# Patient Record
Sex: MALE | Race: BLACK OR AFRICAN AMERICAN | Employment: FULL TIME | URBAN - METROPOLITAN AREA
[De-identification: names, ages, dates, MRNs, and addresses within clinical notes are randomized per-mention and may not be internally consistent; named-entity substitution may affect disease eponyms.]

---

## 2018-10-29 ENCOUNTER — OFFICE VISIT (OUTPATIENT)
Dept: FAMILY MEDICINE CLINIC | Age: 43
End: 2018-10-29

## 2018-10-29 VITALS
DIASTOLIC BLOOD PRESSURE: 98 MMHG | RESPIRATION RATE: 12 BRPM | OXYGEN SATURATION: 100 % | TEMPERATURE: 97.5 F | WEIGHT: 170 LBS | BODY MASS INDEX: 19.67 KG/M2 | HEIGHT: 78 IN | HEART RATE: 92 BPM | SYSTOLIC BLOOD PRESSURE: 143 MMHG

## 2018-10-29 DIAGNOSIS — M79.672 PAIN IN BOTH FEET: Primary | ICD-10-CM

## 2018-10-29 DIAGNOSIS — M79.671 PAIN IN BOTH FEET: Primary | ICD-10-CM

## 2018-10-29 DIAGNOSIS — N52.9 ERECTILE DYSFUNCTION, UNSPECIFIED ERECTILE DYSFUNCTION TYPE: ICD-10-CM

## 2018-10-29 RX ORDER — CLONAZEPAM 1 MG/1
TABLET ORAL 2 TIMES DAILY
COMMUNITY
End: 2018-11-01

## 2018-10-29 RX ORDER — METHIMAZOLE 5 MG/1
TABLET ORAL 3 TIMES DAILY
COMMUNITY
End: 2019-01-23

## 2018-10-29 RX ORDER — TADALAFIL 20 MG/1
TABLET ORAL
Qty: 8 TAB | Refills: 2 | Status: SHIPPED | OUTPATIENT
Start: 2018-10-29 | End: 2019-04-22 | Stop reason: SDUPTHER

## 2018-10-29 NOTE — PATIENT INSTRUCTIONS
Follow up with the podiatrist as planned next month. For the ED, we will do a trial of cialis. Take it every 3 days as needed. Recheck as needed. Erectile Dysfunction: Care Instructions Your Care Instructions A man has erectile dysfunction (ED) when he routinely can't get or keep an erection that allows satisfactory sex. He may not be able to have an erection at any time. Or he may not be able to have one that is firm enough or lasts long enough to complete intercourse. ED is not the same as having trouble getting an erection now and then. That's common. It happens to most men at some time. ED can be caused by problems with the blood vessels, nerves, or hormones. It can be caused by diabetes, heart disease, and injuries. Nerve disorders, such as multiple sclerosis or Parkinson's disease, can also cause it. ED can also be caused by medicines, alcohol, and tobacco. Or it may be caused by depression, stress, grief, or relationship problems. Follow-up care is a key part of your treatment and safety. Be sure to make and go to all appointments, and call your doctor if you are having problems. It's also a good idea to know your test results and keep a list of the medicines you take. How can you care for yourself at home? 
 Lifestyle 
  · Limit alcohol. Have no more than 2 drinks a day.  
  · Do not smoke. Smoking makes it harder for the blood vessels in the penis to relax and let blood flow in. If you need help quitting, talk to your doctor about stop-smoking programs and medicines. These can increase your chances of quitting for good.  
  · Do not use cocaine, heroin, or other illegal drugs.  
  · Try to reduce stress.  
  · Give yourself time to adjust to change. Changes in your job, family, relationships, home life, and other areas can cause stress.  And stress can cause erection problems.  
 Work with your partner 
  · Don't assume that you know what your partner likes when it comes to sex. Zita Kerr may be wrong. Talk about what each of you does and does not enjoy.  
  · Make time outside of the bedroom to talk about your sex life. If you avoid sex because you are afraid of having erection problems, your partner may worry that you are no longer interested.  
  · If you and your partner have trouble talking about sex, see a therapist who can help you talk about it. Reading books with your partner about sexual health may also help.  
  · Relax. Take time for more foreplay. Worrying about your erections may only make things worse. Medicines 
  · Tell your doctor about all the medicines that you take. ? Some medicines can cause erection problems. ? Some medicines can have dangerous interactions with medicines that are prescribed for ED, including over-the-counter medicines and herbal products.  
  · Be safe with medicines. Take your medicines exactly as prescribed. Call your doctor if you think you are having a problem with your medicine.  
  · Talk to your doctor about trying a medicine to help you keep an erection. This could be a medicine such as Viagra, Levitra, or Cialis. If you have a heart problem, ask your doctor if these are safe for you. Do not take these medicines if you take nitroglycerin or other nitrate medicine. When should you call for help? Call your doctor now or seek immediate medical care if: 
  · You took a medicine for erectile dysfunction and you have an erection that lasts longer than 3 hours.  
 Watch closely for changes in your health, and be sure to contact your doctor if you have any problems. Where can you learn more? Go to http://harshil-elmer.info/. Enter [id number] in the search box to learn more about \"Erectile Dysfunction: Care Instructions. \" Current as of: December 3, 2017 Content Version: 11.8 © 8941-7356 [employer].  Care instructions adapted under license by [employer] (which disclaims liability or warranty for this information). If you have questions about a medical condition or this instruction, always ask your healthcare professional. [place] [address] any warranty or liability for your use of this information.

## 2018-10-29 NOTE — PROGRESS NOTES
Rm 1 
Patient presents to the clinic Chief Complaint Patient presents with  Foot Pain  
  bottom of both foot, tingling  Erectile Dysfunction Depression Screening: PHQ over the last two weeks 10/29/2018 Little interest or pleasure in doing things Not at all Feeling down, depressed, irritable, or hopeless Several days Total Score PHQ 2 1 Learning Assessment: 
Learning Assessment 10/29/2018 PRIMARY LEARNER Patient HIGHEST LEVEL OF EDUCATION - PRIMARY LEARNER  > 4 YEARS OF COLLEGE  
BARRIERS PRIMARY LEARNER NONE  
CO-LEARNER CAREGIVER No  
PRIMARY LANGUAGE ENGLISH  
LEARNER PREFERENCE PRIMARY VIDEOS  
  PICTURES  
ANSWERED BY patient RELATIONSHIP SELF Abuse Screening: No flowsheet data found. Health Maintenance reviewed and discussed per provider: yes Coordination of Care: 1. Have you been to the ER, urgent care clinic since your last visit? Hospitalized since your last visit? no 
 
2. Have you seen or consulted any other health care providers outside of the [address] [place] [address] since your last visit? Include any pap smears or colon screening.  no

## 2018-10-30 ENCOUNTER — TELEPHONE (OUTPATIENT)
Dept: FAMILY MEDICINE CLINIC | Age: 43
End: 2018-10-30

## 2018-10-30 NOTE — TELEPHONE ENCOUNTER
Pt stating he needs anxiety medication (Klonopin). He has an old script from his old PCP but when he tried to fill it at his pharmacy yesterday, they would not fill it because it was written by his old PCP and not Dr Obey Carey. Pt states he has about 5 pills left and he will need a refill.

## 2018-10-30 NOTE — PROGRESS NOTES
HISTORY OF PRESENT ILLNESS Jordan Mcardle is a 37 y.o. male. Mr. Lauren Mccullough is here to establish care for a couple of reasons. First of all, he has had tingling in both feet for 4-6 months. When he wears dress shoes that are flat, he notices it more. Also, when he is on his feet all day it gets worse. He has an appt with podiatry coming up. Secondly, he c/o erectile dysfunction. He can get erections but they go away quickly, He has no history of heart problems and doesn't take any medication that is a contraindication to ED meds. At the very end of the visit he showed up a two y/o bottle of Klonopin, asked for a refill. Told him that we generally don't prescribe that long-term and we didn't address that this visit, we can talk about that next time. Appaently, his job is getting more stressful Review of Systems Constitutional: Negative for chills and fever. HENT: Negative for hearing loss. Eyes: Negative for blurred vision and double vision. Respiratory: Negative for cough and shortness of breath. Cardiovascular: Negative for chest pain and palpitations. Gastrointestinal: Negative for abdominal pain, nausea and vomiting. Genitourinary:  
     See hpi Musculoskeletal:  
     Foot pain, Neurological: Positive for tingling. Negative for headaches. Psychiatric/Behavioral: Negative for depression and suicidal ideas. The patient is nervous/anxious. Physical Exam  
Constitutional: He is oriented to person, place, and time. He appears well-developed and well-nourished. Eyes: Pupils are equal, round, and reactive to light. Neck: Neck supple. Cardiovascular: Normal rate and regular rhythm. Pulmonary/Chest: Effort normal and breath sounds normal.  
Abdominal: Soft. There is no tenderness. Genitourinary: Penis normal. No penile tenderness. Musculoskeletal: He exhibits no edema or tenderness. Lymphadenopathy:  
  He has no cervical adenopathy. Neurological: He is alert and oriented to person, place, and time. Foot sensory exam normal  
Nursing note and vitals reviewed. ASSESSMENT and PLAN 
  ICD-10-CM ICD-9-CM 1. Pain in both feet M79.671 729.5   
 M79.672 2.  Erectile dysfunction, unspecified erectile dysfunction type N52.9 607.84

## 2018-11-01 ENCOUNTER — OFFICE VISIT (OUTPATIENT)
Dept: FAMILY MEDICINE CLINIC | Age: 43
End: 2018-11-01

## 2018-11-01 VITALS
HEART RATE: 90 BPM | OXYGEN SATURATION: 100 % | RESPIRATION RATE: 16 BRPM | TEMPERATURE: 97.4 F | WEIGHT: 174 LBS | BODY MASS INDEX: 20.13 KG/M2 | SYSTOLIC BLOOD PRESSURE: 137 MMHG | DIASTOLIC BLOOD PRESSURE: 95 MMHG | HEIGHT: 78 IN

## 2018-11-01 DIAGNOSIS — F41.1 GENERALIZED ANXIETY DISORDER: Primary | ICD-10-CM

## 2018-11-01 DIAGNOSIS — G47.00 INSOMNIA, UNSPECIFIED TYPE: ICD-10-CM

## 2018-11-01 RX ORDER — TRAZODONE HYDROCHLORIDE 50 MG/1
50 TABLET ORAL
Qty: 30 TAB | Refills: 1 | Status: SHIPPED | OUTPATIENT
Start: 2018-11-01 | End: 2019-01-23

## 2018-11-01 RX ORDER — CITALOPRAM 10 MG/1
10 TABLET ORAL DAILY
Qty: 30 TAB | Refills: 1 | Status: SHIPPED | OUTPATIENT
Start: 2018-11-01 | End: 2019-02-08

## 2018-11-01 RX ORDER — CLONAZEPAM 1 MG/1
TABLET ORAL
Qty: 20 TAB | Refills: 0 | Status: SHIPPED | OUTPATIENT
Start: 2018-11-01 | End: 2018-12-04 | Stop reason: SDUPTHER

## 2018-11-01 NOTE — PATIENT INSTRUCTIONS
For the anxiety, take celexa every morning regularly. After a few weeks you should start to notice a difference in your stress levels. In the meantime, there are a few klonopin tablets to use in acutely stressful situations. This is not meant to be a long-term medication, it can be addicting if used long-term. For sleep, take trazodone every night at bedtime. Continue with your therapist, that's very important. Recheck 1 month.

## 2018-11-01 NOTE — PROGRESS NOTES
Rm 3 
Patient presents to the clinic Chief Complaint Patient presents with  Anxiety  
  wants medication for anxitey Depression Screening: PHQ over the last two weeks 11/1/2018 10/29/2018 Little interest or pleasure in doing things Not at all Not at all Feeling down, depressed, irritable, or hopeless Not at all Several days Total Score PHQ 2 0 1 Learning Assessment: 
Learning Assessment 10/29/2018 PRIMARY LEARNER Patient HIGHEST LEVEL OF EDUCATION - PRIMARY LEARNER  > 4 YEARS OF COLLEGE  
BARRIERS PRIMARY LEARNER NONE  
CO-LEARNER CAREGIVER No  
PRIMARY LANGUAGE ENGLISH  
LEARNER PREFERENCE PRIMARY VIDEOS  
  PICTURES  
ANSWERED BY patient RELATIONSHIP SELF Abuse Screening: No flowsheet data found. Health Maintenance reviewed and discussed per provider: yes Coordination of Care: 1. Have you been to the ER, urgent care clinic since your last visit? Hospitalized since your last visit? no 
 
2. Have you seen or consulted any other health care providers outside of the [facility] since your last visit? Include any pap smears or colon screening.  no

## 2018-11-01 NOTE — PROGRESS NOTES
HISTORY OF PRESENT ILLNESS Donna Fleming is a 37 y.o. male. Mr. Vickie Patrick presents to the office today for feelings of depression and anxiety x 3 weeks. He says that following his promotion at work he has felt under considerably more stress and is trying to use the resources that he can to better take care of himself. He says he is seeing a therapist regularly and she had encouraged him to restart his anxiety medication or to ask his new primary care about alternatives. He had been taking Klonopin only. He denies any suicidal or homicidal ideation. He also mentions that he can't get more than 4-5 hours of sleep, but he's afraid of the sleeping pills. Review of Systems Constitutional: Negative for chills and fever. Eyes: Negative for blurred vision and double vision. Respiratory: Negative for cough and shortness of breath. Cardiovascular: Negative for chest pain and palpitations. Gastrointestinal: Negative for abdominal pain, nausea and vomiting. Neurological: Negative for headaches. Psychiatric/Behavioral: Positive for depression. Negative for hallucinations, memory loss and suicidal ideas. The patient is nervous/anxious and has insomnia. Physical Exam  
Constitutional: He is oriented to person, place, and time. He appears well-developed and well-nourished. Eyes: Pupils are equal, round, and reactive to light. Neck: Neck supple. Cardiovascular: Normal rate, regular rhythm and normal heart sounds. Pulmonary/Chest: Effort normal and breath sounds normal. No respiratory distress. He has no wheezes. He has no rales. Abdominal: Soft. He exhibits no distension. There is no tenderness. Lymphadenopathy:  
  He has no cervical adenopathy. Neurological: He is alert and oriented to person, place, and time. Psychiatric: He has a normal mood and affect. His behavior is normal.  
Nursing note and vitals reviewed. ASSESSMENT and PLAN 
  ICD-10-CM ICD-9-CM 1. Generalized anxiety disorder F41.1 300.02 clonazePAM (KLONOPIN) 1 mg tablet 2.  Insomnia, unspecified type G47.00 780.52

## 2018-11-13 ENCOUNTER — OFFICE VISIT (OUTPATIENT)
Dept: FAMILY MEDICINE CLINIC | Age: 43
End: 2018-11-13

## 2018-11-13 VITALS
HEART RATE: 97 BPM | OXYGEN SATURATION: 99 % | DIASTOLIC BLOOD PRESSURE: 91 MMHG | TEMPERATURE: 97.5 F | WEIGHT: 169.6 LBS | HEIGHT: 78 IN | SYSTOLIC BLOOD PRESSURE: 130 MMHG | BODY MASS INDEX: 19.62 KG/M2 | RESPIRATION RATE: 16 BRPM

## 2018-11-13 DIAGNOSIS — F41.1 GENERALIZED ANXIETY DISORDER: Primary | ICD-10-CM

## 2018-11-13 NOTE — LETTER
NOTIFICATION RETURN TO WORK / SCHOOL 
 
11/13/2018 4:09 PM 
 
Mr. Mari Vázquez [address] [facility] [address] [phone] To Whom It May Concern: 
 
Mari Vázquez is currently under the care of [address] [facility]. He will return to work/school on: 11/14/2018. He is under my care for acute anxiety If there are questions or concerns please have the patient contact our office. Sincerely, Paulino Gautam MD

## 2018-11-13 NOTE — LETTER
NOTIFICATION RETURN TO WORK / SCHOOL 
 
11/13/2018 4:00 PM 
 
Mr. Fanny Alexander [address] [facility] [address] [phone] To Whom It May Concern: 
 
Fanny Alexander is currently under the care of [address] [facility]. He will return to work/school on: 11/15/2018. He is currently under my care for an anxiety disorder. If there are questions or concerns please have the patient contact our office. Sincerely, Rudy Koenig MD

## 2018-11-13 NOTE — PATIENT INSTRUCTIONS
Continue the current medications and especially the counseling with your therapist. 
 
Alem Caro as needed.

## 2018-11-13 NOTE — PROGRESS NOTES
Rm 2 
Patient presents to the clinic Chief Complaint Patient presents with  Fatigue  
  making it hard to do his job which started in September  Hyperemesis  
  which starts with a cough in the mornings before going to work, started towards end of September Depression Screening: PHQ over the last two weeks 11/1/2018 10/29/2018 Little interest or pleasure in doing things Not at all Not at all Feeling down, depressed, irritable, or hopeless Not at all Several days Total Score PHQ 2 0 1 Learning Assessment: 
Learning Assessment 10/29/2018 PRIMARY LEARNER Patient HIGHEST LEVEL OF EDUCATION - PRIMARY LEARNER  > 4 YEARS OF COLLEGE  
BARRIERS PRIMARY LEARNER NONE  
CO-LEARNER CAREGIVER No  
PRIMARY LANGUAGE ENGLISH  
LEARNER PREFERENCE PRIMARY VIDEOS  
  PICTURES  
ANSWERED BY patient RELATIONSHIP SELF Abuse Screening: No flowsheet data found. Health Maintenance reviewed and discussed per provider: yes Coordination of Care: 1. Have you been to the ER, urgent care clinic since your last visit? Hospitalized since your last visit? no 
 
2. Have you seen or consulted any other health care providers outside of the [facility] since your last visit? Include any pap smears or colon screening.  no

## 2018-12-04 DIAGNOSIS — F41.1 GENERALIZED ANXIETY DISORDER: ICD-10-CM

## 2018-12-04 RX ORDER — CLONAZEPAM 1 MG/1
TABLET ORAL
Qty: 15 TAB | Refills: 0 | Status: SHIPPED | OUTPATIENT
Start: 2018-12-04 | End: 2019-01-03 | Stop reason: SDUPTHER

## 2019-01-02 ENCOUNTER — HOSPITAL ENCOUNTER (EMERGENCY)
Age: 44
Discharge: HOME | End: 2019-01-02
Attending: EMERGENCY MEDICINE
Payer: COMMERCIAL

## 2019-01-02 VITALS
BODY MASS INDEX: 19.67 KG/M2 | WEIGHT: 170 LBS | RESPIRATION RATE: 15 BRPM | HEART RATE: 93 BPM | TEMPERATURE: 98.1 F | OXYGEN SATURATION: 100 % | SYSTOLIC BLOOD PRESSURE: 148 MMHG | HEIGHT: 78 IN | DIASTOLIC BLOOD PRESSURE: 98 MMHG

## 2019-01-02 DIAGNOSIS — R79.89 ABNORMAL LFTS: ICD-10-CM

## 2019-01-02 DIAGNOSIS — E86.0 DEHYDRATION: ICD-10-CM

## 2019-01-02 DIAGNOSIS — R53.1 GENERAL WEAKNESS: Primary | ICD-10-CM

## 2019-01-02 LAB
ALBUMIN SERPL-MCNC: 4.4 G/DL (ref 3.4–5)
ALBUMIN/GLOB SERPL: 1.1 {RATIO} (ref 0.8–1.7)
ALP SERPL-CCNC: 110 U/L (ref 45–117)
ALT SERPL-CCNC: 139 U/L (ref 16–61)
ANION GAP SERPL CALC-SCNC: 19 MMOL/L (ref 3–18)
APPEARANCE UR: CLEAR
AST SERPL-CCNC: 200 U/L (ref 15–37)
BACTERIA URNS QL MICRO: ABNORMAL /HPF
BASOPHILS # BLD: 0 K/UL (ref 0–0.1)
BASOPHILS NFR BLD: 0 % (ref 0–2)
BILIRUB SERPL-MCNC: 0.8 MG/DL (ref 0.2–1)
BILIRUB UR QL: NEGATIVE
BUN SERPL-MCNC: 7 MG/DL (ref 7–18)
BUN/CREAT SERPL: 7 (ref 12–20)
CALCIUM SERPL-MCNC: 9.2 MG/DL (ref 8.5–10.1)
CHLORIDE SERPL-SCNC: 93 MMOL/L (ref 100–108)
CK MB CFR SERPL CALC: 0.6 % (ref 0–4)
CK MB SERPL-MCNC: 2.4 NG/ML (ref 5–25)
CK SERPL-CCNC: 373 U/L (ref 39–308)
CO2 SERPL-SCNC: 21 MMOL/L (ref 21–32)
COLOR UR: YELLOW
CREAT SERPL-MCNC: 0.98 MG/DL (ref 0.6–1.3)
DIFFERENTIAL METHOD BLD: ABNORMAL
EOSINOPHIL # BLD: 0 K/UL (ref 0–0.4)
EOSINOPHIL NFR BLD: 0 % (ref 0–5)
EPITH CASTS URNS QL MICRO: ABNORMAL /LPF (ref 0–5)
ERYTHROCYTE [DISTWIDTH] IN BLOOD BY AUTOMATED COUNT: 13.6 % (ref 11.6–14.5)
GLOBULIN SER CALC-MCNC: 4 G/DL (ref 2–4)
GLUCOSE SERPL-MCNC: 81 MG/DL (ref 74–99)
GLUCOSE UR STRIP.AUTO-MCNC: NEGATIVE MG/DL
GRAN CASTS URNS QL MICRO: ABNORMAL /LPF
HCT VFR BLD AUTO: 39.9 % (ref 36–48)
HGB BLD-MCNC: 13.9 G/DL (ref 13–16)
HGB UR QL STRIP: ABNORMAL
KETONES UR QL STRIP.AUTO: 80 MG/DL
LEUKOCYTE ESTERASE UR QL STRIP.AUTO: NEGATIVE
LYMPHOCYTES # BLD: 1.4 K/UL (ref 0.9–3.6)
LYMPHOCYTES NFR BLD: 27 % (ref 21–52)
MAGNESIUM SERPL-MCNC: 2.2 MG/DL (ref 1.6–2.6)
MCH RBC QN AUTO: 34.6 PG (ref 24–34)
MCHC RBC AUTO-ENTMCNC: 34.8 G/DL (ref 31–37)
MCV RBC AUTO: 99.3 FL (ref 74–97)
MONOCYTES # BLD: 0.3 K/UL (ref 0.05–1.2)
MONOCYTES NFR BLD: 5 % (ref 3–10)
NEUTS SEG # BLD: 3.5 K/UL (ref 1.8–8)
NEUTS SEG NFR BLD: 68 % (ref 40–73)
NITRITE UR QL STRIP.AUTO: NEGATIVE
PH UR STRIP: 5 [PH] (ref 5–8)
PLATELET # BLD AUTO: 146 K/UL (ref 135–420)
PMV BLD AUTO: 11.1 FL (ref 9.2–11.8)
POTASSIUM SERPL-SCNC: 4.4 MMOL/L (ref 3.5–5.5)
PROT SERPL-MCNC: 8.4 G/DL (ref 6.4–8.2)
PROT UR STRIP-MCNC: 300 MG/DL
RBC # BLD AUTO: 4.02 M/UL (ref 4.7–5.5)
RBC #/AREA URNS HPF: ABNORMAL /HPF (ref 0–5)
SODIUM SERPL-SCNC: 133 MMOL/L (ref 136–145)
SP GR UR REFRACTOMETRY: 1.03 (ref 1–1.03)
TROPONIN I SERPL-MCNC: <0.02 NG/ML (ref 0–0.04)
TSH SERPL DL<=0.05 MIU/L-ACNC: 1.9 UIU/ML (ref 0.36–3.74)
UROBILINOGEN UR QL STRIP.AUTO: 1 EU/DL (ref 0.2–1)
WBC # BLD AUTO: 5.1 K/UL (ref 4.6–13.2)
WBC URNS QL MICRO: 0 /HPF (ref 0–4)

## 2019-01-02 PROCEDURE — 96361 HYDRATE IV INFUSION ADD-ON: CPT

## 2019-01-02 PROCEDURE — 74011250637 HC RX REV CODE- 250/637: Performed by: EMERGENCY MEDICINE

## 2019-01-02 PROCEDURE — 93005 ELECTROCARDIOGRAM TRACING: CPT

## 2019-01-02 PROCEDURE — 85025 COMPLETE CBC W/AUTO DIFF WBC: CPT

## 2019-01-02 PROCEDURE — 80053 COMPREHEN METABOLIC PANEL: CPT

## 2019-01-02 PROCEDURE — 74011250636 HC RX REV CODE- 250/636: Performed by: EMERGENCY MEDICINE

## 2019-01-02 PROCEDURE — 84443 ASSAY THYROID STIM HORMONE: CPT

## 2019-01-02 PROCEDURE — 81001 URINALYSIS AUTO W/SCOPE: CPT

## 2019-01-02 PROCEDURE — 83735 ASSAY OF MAGNESIUM: CPT

## 2019-01-02 PROCEDURE — 99284 EMERGENCY DEPT VISIT MOD MDM: CPT

## 2019-01-02 PROCEDURE — 96360 HYDRATION IV INFUSION INIT: CPT

## 2019-01-02 PROCEDURE — 82550 ASSAY OF CK (CPK): CPT

## 2019-01-02 RX ORDER — THERA TABS 400 MCG
1 TAB ORAL
Status: COMPLETED | OUTPATIENT
Start: 2019-01-02 | End: 2019-01-02

## 2019-01-02 RX ORDER — THERA TABS 400 MCG
1 TAB ORAL DAILY
Qty: 30 TAB | Refills: 0 | Status: SHIPPED | OUTPATIENT
Start: 2019-01-02 | End: 2019-02-01

## 2019-01-02 RX ADMIN — SODIUM CHLORIDE 1000 ML: 900 INJECTION, SOLUTION INTRAVENOUS at 15:21

## 2019-01-02 RX ADMIN — THERA TABS 1 TABLET: TAB at 14:31

## 2019-01-02 RX ADMIN — SODIUM CHLORIDE 1000 ML: 900 INJECTION, SOLUTION INTRAVENOUS at 14:27

## 2019-01-02 NOTE — ED NOTES
Purposeful rounding completed: 
 
Side rails up x 1:  YES Bed in low position and wheels locked: YES Call bell within reach: YES Comfort addressed: YES Toileting needs addressed: YES Plan of care reviewed/updated with patient and or family members: YES 
IV site assessed: YES Pain assessed and addressed: YES, 1

## 2019-01-02 NOTE — DISCHARGE INSTRUCTIONS
Patient Education        Dehydration: Care Instructions  Your Care Instructions  Dehydration happens when your body loses too much fluid. This might happen when you do not drink enough water or you lose large amounts of fluids from your body because of diarrhea, vomiting, or sweating. Severe dehydration can be life-threatening. Water and minerals called electrolytes help put your body fluids back in balance. Learn the early signs of fluid loss, and drink more fluids to prevent dehydration. Follow-up care is a key part of your treatment and safety. Be sure to make and go to all appointments, and call your doctor if you are having problems. It's also a good idea to know your test results and keep a list of the medicines you take. How can you care for yourself at home? · To prevent dehydration, drink plenty of fluids, enough so that your urine is light yellow or clear like water. Choose water and other caffeine-free clear liquids until you feel better. If you have kidney, heart, or liver disease and have to limit fluids, talk with your doctor before you increase the amount of fluids you drink. · If you do not feel like eating or drinking, try taking small sips of water, sports drinks, or other rehydration drinks. · Get plenty of rest.  To prevent dehydration  · Add more fluids to your diet and daily routine, unless your doctor has told you not to. · During hot weather, drink more fluids. Drink even more fluids if you exercise a lot. Stay away from drinks with alcohol or caffeine. · Watch for the symptoms of dehydration. These include:  ? A dry, sticky mouth. ? Dark yellow urine, and not much of it. ? Dry and sunken eyes. ? Feeling very tired. · Learn what problems can lead to dehydration. These include:  ? Diarrhea, fever, and vomiting. ? Any illness with a fever, such as pneumonia or the flu. ?  Activities that cause heavy sweating, such as endurance races and heavy outdoor work in hot or humid weather. ? Alcohol or drug abuse or withdrawal.  ? Certain medicines, such as cold and allergy pills (antihistamines), diet pills (diuretics), and laxatives. ? Certain diseases, such as diabetes, cancer, and heart or kidney disease. When should you call for help? Call 911 anytime you think you may need emergency care. For example, call if:    · You passed out (lost consciousness).    Call your doctor now or seek immediate medical care if:    · You are confused and cannot think clearly.     · You are dizzy or lightheaded, or you feel like you may faint.     · You have signs of needing more fluids. You have sunken eyes and a dry mouth, and you pass only a little dark urine.     · You cannot keep fluids down.    Watch closely for changes in your health, and be sure to contact your doctor if:    · You are not making tears.     · Your skin is very dry and sags slowly back into place after you pinch it.     · Your mouth and eyes are very dry. Where can you learn more? Go to http://harshil-elmer.info/. Enter Q467 in the search box to learn more about \"Dehydration: Care Instructions. \"  Current as of: November 20, 2017  Content Version: 11.8  © 9850-0722 [employer]. Care instructions adapted under license by [employer] (which disclaims liability or warranty for this information). If you have questions about a medical condition or this instruction, always ask your healthcare professional. [place] [address] any warranty or liability for your use of this information. Patient Education        Fatigue: Care Instructions  Your Care Instructions    Fatigue is a feeling of tiredness, exhaustion, or lack of energy. You may feel fatigue because of too much or not enough activity. It can also come from stress, lack of sleep, boredom, and poor diet. Many medical problems, such as viral infections, can cause fatigue.  Emotional problems, especially depression, are often the cause of fatigue. Fatigue is most often a symptom of another problem. Treatment for fatigue depends on the cause. For example, if you have fatigue because you have a certain health problem, treating this problem also treats your fatigue. If depression or anxiety is the cause, treatment may help. Follow-up care is a key part of your treatment and safety. Be sure to make and go to all appointments, and call your doctor if you are having problems. It's also a good idea to know your test results and keep a list of the medicines you take. How can you care for yourself at home? · Get regular exercise. But don't overdo it. Go back and forth between rest and exercise. · Get plenty of rest.  · Eat a healthy diet. Do not skip meals, especially breakfast.  · Reduce your use of caffeine, tobacco, and alcohol. Caffeine is most often found in coffee, tea, cola drinks, and chocolate. · Limit medicines that can cause fatigue. This includes tranquilizers and cold and allergy medicines. When should you call for help? Watch closely for changes in your health, and be sure to contact your doctor if:    · You have new symptoms such as fever or a rash.     · Your fatigue gets worse.     · You have been feeling down, depressed, or hopeless. Or you may have lost interest in things that you usually enjoy.     · You are not getting better as expected. Where can you learn more? Go to http://harshil-elmer.info/. Enter P761 in the search box to learn more about \"Fatigue: Care Instructions. \"  Current as of: November 20, 2017  Content Version: 11.8  © 8614-6458 [employer]. Care instructions adapted under license by [employer] (which disclaims liability or warranty for this information).  If you have questions about a medical condition or this instruction, always ask your healthcare professional. [address] any warranty or liability for your use of this information. Patient Education        Liver Function Tests: About These Tests  What is it? Liver function tests check how well your liver is working. Some tests measure the amount of certain enzymes in your blood to check whether the liver is damaged or inflamed. Other tests measure how well the liver can make important proteins or clear waste products from the body. Your doctor will use the test results to help look for certain conditions, such as hepatitis, cirrhosis, or gallbladder problems. Test results that are not normal do not always mean there is a problem with your liver. Your doctor can determine if there is a problem based on your results. The tests may include:  · Alkaline phosphatase (ALP). This test measures the amount of the enzyme ALP in your blood. · Total protein. A total serum protein test measures the amounts of two major groups of proteins in your blood (albumin and globulin) and the total amount of protein. · Bilirubin. This test measures the amount of bilirubin in your blood. When bilirubin levels are high, the skin and whites of the eyes may appear yellow (jaundice). This may be caused by liver disease. · Aspartate aminotransferase (AST) and alanine aminotransferase (ALT). These tests measure the amount of the enzymes AST and ALT in your blood. (Aminotransferase is also known as a transaminase. High levels may be called transaminitis.)  Why is this test done? Liver function tests check how well your liver is working. Some tests help show whether your liver is damaged or inflamed. Your doctor may order liver function tests if you have symptoms of liver disease. These tests also may be done to see how well a treatment for liver disease is working. How can you prepare for the test?  In general, you do not need to prepare before having these tests. Your doctor may give you some specific instructions to prepare.   What happens during the test?  A health professional takes a sample of your blood. What happens after the test?  You will probably be able to go home right away. When should you call for help? Watch closely for changes in your health, and be sure to contact your doctor if you have any problems. Follow-up care is a key part of your treatment and safety. Be sure to make and go to all appointments, and call your doctor if you are having problems. It's also a good idea to keep a list of the medicines you take. Ask your doctor when you can expect to have your test results. Where can you learn more? Go to http://harshil-elmer.info/. Enter M228 in the search box to learn more about \"Liver Function Tests: About These Tests. \"  Current as of: June 26, 2018  Content Version: 11.8  © 3427-1635 [employer]. Care instructions adapted under license by [employer] (which disclaims liability or warranty for this information). If you have questions about a medical condition or this instruction, always ask your healthcare professional. [address] any warranty or liability for your use of this information.

## 2019-01-02 NOTE — ED PROVIDER NOTES
EMERGENCY DEPARTMENT HISTORY AND PHYSICAL EXAM 
 
1:54 PM 
 
 
Date: 1/2/2019 Patient Name: Jade Stark History of Presenting Illness Chief Complaint Patient presents with  Dizziness  Fatigue  Arm Pain History Provided By: Patient Chief Complaint: Dizziness Duration: 1 Days Timing:  Acute Location: N/A Quality: Symptomatic Severity: Moderate Modifying Factors: Nothing makes it better or worse Associated Symptoms: appetite change, fatigue, tingling in feet Additional History (Context): Jade Stark is a 37 y.o. male with IBS and thyroid disease who presents with acute, symptomatic and moderate dizziness that started about 1 day ago. Patient said that he had this problem back in July of last year. He believes that it is stress related and thinks he is dehydrated. Patient also said that he has not been eating or drinking anything like he usually does. Denies any vomiting, diarrhea and bloody stool. Denies taking a multivitamin but has taken vitamin D before. He said that his PCP, Dr. Renetta Santiago, told him to come into the ER. Patient also said that he ahs seen a Podiatrist for his feet too. Nothing makes it better or worse and associated symptoms are appetite change, fatigue and tingling in feet. No other concerns or symptoms at this time. PCP: Mikey Claire MD 
 
Current Facility-Administered Medications Medication Dose Route Frequency Provider Last Rate Last Dose  sodium chloride 0.9 % bolus infusion 1,000 mL  1,000 mL IntraVENous ONCE Fabian Beltran MD 1,000 mL/hr at 01/02/19 1521 1,000 mL at 01/02/19 1521 Current Outpatient Medications Medication Sig Dispense Refill  therapeutic multivitamin (THERAGRAN) tablet Take 1 Tab by mouth daily for 30 doses. 30 Tab 0  clonazePAM (KLONOPIN) 1 mg tablet 1/2-1 tablet twice daily prn acute stressful situations 15 Tab 0  
 citalopram (CELEXA) 10 mg tablet Take 1 Tab by mouth daily.  30 Tab 1  
  traZODone (DESYREL) 50 mg tablet Take 1 Tab by mouth nightly. 30 Tab 1  
 methIMAzole (TAPAZOLE) 5 mg tablet Take  by mouth three (3) times daily.  tadalafil (CIALIS) 20 mg tablet One tablet every 3 days as needed 8 Tab 2 Past History Past Medical History: 
Past Medical History:  
Diagnosis Date  IBS (irritable bowel syndrome)  Thyroid disease Past Surgical History: 
History reviewed. No pertinent surgical history. Family History: 
Family History Problem Relation Age of Onset  Hypertension Mother Munguia Arthritis-osteo Mother Social History: 
Social History Tobacco Use  Smoking status: Never Smoker  Smokeless tobacco: Current User Substance Use Topics  Alcohol use: Yes  Drug use: No  
 
 
Allergies: 
No Known Allergies Review of Systems Review of Systems Constitutional: Positive for appetite change and fatigue. Negative for activity change and fever. HENT: Negative for congestion and rhinorrhea. Eyes: Negative for visual disturbance. Respiratory: Negative for shortness of breath. Cardiovascular: Negative for chest pain and palpitations. Gastrointestinal: Negative for abdominal pain, blood in stool, diarrhea, nausea and vomiting. Genitourinary: Negative for dysuria and hematuria. Musculoskeletal: Negative for back pain. Skin: Negative for rash. Neurological: Positive for dizziness. Negative for weakness and light-headedness. Tingling in feet Psychiatric/Behavioral: Negative for agitation. All other systems reviewed and are negative. Physical Exam  
 
Visit Vitals BP (!) 153/99 Pulse 94 Temp 98.1 °F (36.7 °C) Resp 15 Ht 6' 7\" (2.007 m) Wt 77.1 kg (170 lb) SpO2 99% BMI 19.15 kg/m² Physical Exam  
Constitutional: He appears well-developed and well-nourished. HENT:  
Head: Normocephalic and atraumatic. Mouth/Throat: Mucous membranes are dry (moderately ). Eyes: Conjunctivae are normal. Pupils are equal, round, and reactive to light. Neck: Normal range of motion. Neck supple. Cardiovascular: Normal rate and regular rhythm. Pulses: 
     Dorsalis pedis pulses are 2+ on the right side, and 2+ on the left side. Good dorsalis pedis pulse in LEs Pulmonary/Chest: Effort normal and breath sounds normal.  
Abdominal: Soft. Bowel sounds are normal.  
Musculoskeletal: Normal range of motion. Lymphadenopathy:  
  He has no cervical adenopathy. Neurological: He is alert. Skin: Skin is warm. Psychiatric: He has a normal mood and affect. Diagnostic Study Results Labs - Recent Results (from the past 12 hour(s)) URINALYSIS W/ RFLX MICROSCOPIC Collection Time: 01/02/19  1:30 PM  
Result Value Ref Range Color YELLOW Appearance CLEAR Specific gravity 1.027 1.005 - 1.030    
 pH (UA) 5.0 5.0 - 8.0 Protein 300 (A) NEG mg/dL Glucose NEGATIVE  NEG mg/dL Ketone 80 (A) NEG mg/dL Bilirubin NEGATIVE  NEG Blood SMALL (A) NEG Urobilinogen 1.0 0.2 - 1.0 EU/dL Nitrites NEGATIVE  NEG Leukocyte Esterase NEGATIVE  NEG    
URINE MICROSCOPIC ONLY Collection Time: 01/02/19  1:30 PM  
Result Value Ref Range WBC 0 0 - 4 /hpf  
 RBC 0 to 3 0 - 5 /hpf Epithelial cells 1+ 0 - 5 /lpf Bacteria 1+ (A) NEG /hpf  
 Granular cast 2 to 6 NEG /lpf EKG, 12 LEAD, INITIAL Collection Time: 01/02/19  2:00 PM  
Result Value Ref Range Ventricular Rate 96 BPM  
 Atrial Rate 96 BPM  
 P-R Interval 154 ms QRS Duration 82 ms Q-T Interval 362 ms QTC Calculation (Bezet) 457 ms Calculated P Axis 70 degrees Calculated R Axis 56 degrees Calculated T Axis 72 degrees Diagnosis Normal sinus rhythm Minimal voltage criteria for LVH, may be normal variant Borderline ECG No previous ECGs available CBC WITH AUTOMATED DIFF Collection Time: 01/02/19  2:06 PM  
Result Value Ref Range WBC 5.1 4.6 - 13.2 K/uL  
 RBC 4.02 (L) 4.70 - 5.50 M/uL  
 HGB 13.9 13.0 - 16.0 g/dL HCT 39.9 36.0 - 48.0 % MCV 99.3 (H) 74.0 - 97.0 FL  
 MCH 34.6 (H) 24.0 - 34.0 PG  
 MCHC 34.8 31.0 - 37.0 g/dL  
 RDW 13.6 11.6 - 14.5 % PLATELET 562 740 - 911 K/uL MPV 11.1 9.2 - 11.8 FL  
 NEUTROPHILS 68 40 - 73 % LYMPHOCYTES 27 21 - 52 % MONOCYTES 5 3 - 10 % EOSINOPHILS 0 0 - 5 % BASOPHILS 0 0 - 2 %  
 ABS. NEUTROPHILS 3.5 1.8 - 8.0 K/UL  
 ABS. LYMPHOCYTES 1.4 0.9 - 3.6 K/UL  
 ABS. MONOCYTES 0.3 0.05 - 1.2 K/UL  
 ABS. EOSINOPHILS 0.0 0.0 - 0.4 K/UL  
 ABS. BASOPHILS 0.0 0.0 - 0.1 K/UL  
 DF AUTOMATED METABOLIC PANEL, COMPREHENSIVE Collection Time: 01/02/19  2:06 PM  
Result Value Ref Range Sodium 133 (L) 136 - 145 mmol/L Potassium 4.4 3.5 - 5.5 mmol/L Chloride 93 (L) 100 - 108 mmol/L  
 CO2 21 21 - 32 mmol/L Anion gap 19 (H) 3.0 - 18 mmol/L Glucose 81 74 - 99 mg/dL BUN 7 7.0 - 18 MG/DL Creatinine 0.98 0.6 - 1.3 MG/DL  
 BUN/Creatinine ratio 7 (L) 12 - 20 GFR est AA >60 >60 ml/min/1.73m2 GFR est non-AA >60 >60 ml/min/1.73m2 Calcium 9.2 8.5 - 10.1 MG/DL Bilirubin, total 0.8 0.2 - 1.0 MG/DL  
 ALT (SGPT) 139 (H) 16 - 61 U/L  
 AST (SGOT) 200 (H) 15 - 37 U/L Alk. phosphatase 110 45 - 117 U/L Protein, total 8.4 (H) 6.4 - 8.2 g/dL Albumin 4.4 3.4 - 5.0 g/dL Globulin 4.0 2.0 - 4.0 g/dL A-G Ratio 1.1 0.8 - 1.7 MAGNESIUM Collection Time: 01/02/19  2:06 PM  
Result Value Ref Range Magnesium 2.2 1.6 - 2.6 mg/dL CARDIAC PANEL,(CK, CKMB & TROPONIN) Collection Time: 01/02/19  2:06 PM  
Result Value Ref Range  (H) 39 - 308 U/L  
 CK - MB 2.4 <3.6 ng/ml CK-MB Index 0.6 0.0 - 4.0 % Troponin-I, QT <0.02 0.0 - 0.045 NG/ML  
TSH 3RD GENERATION Collection Time: 01/02/19  2:06 PM  
Result Value Ref Range TSH 1.90 0.36 - 3.74 uIU/mL Radiologic Studies - No orders to display Medical Decision Making I am the first provider for this patient. I reviewed the vital signs, available nursing notes, past medical history, past surgical history, family history and social history. Vital Signs-Reviewed the patient's vital signs. Pulse Oximetry Analysis -  100% on room air Cardiac Monitor: 
Rate: 100 bpm 
 
EKG: Time 1400, 96, sinus rhythm, normal intervals and axes. There is no sign of acute ischemia. Records Reviewed: Nursing Notes and Old Medical Records (Time of Review: 1:54 PM) 
 
ED Course: Progress Notes, Reevaluation, and Consults: 
3:25 PM 
electrolytes, with anion gap of 19 
urinalysis 80 ketones ALT of 139 AST of 200 Bilirubin normal 
Pateint has no abdominal tenderness 3:40 PM 
Reevaluated, no distress, no abdomen tenderness or pain. Feels comfortable with follow up Provider Notes (Medical Decision Making):  
Patient presents to emergency department feeling fatigued lack of appetite history of irritable bowel syndrome patient feels dehydrated and also chronic lower extremity paresthesia per patient states that he presented to the emergency department because he was to be hydrated up. He called his primary care physician. There is no chest pain shortness of breath focal neurological deficit there is no new vomiting or diarrhea patient. I will check electrolyte 
hemoglobin, any thyroid issues. If negative plan giving patient fluids and patient will follow-up with his primary care physician for further management. Patient with dehydration, generalized weakness. Mildly ketotic. Hydrate multivitaimin and follow up with pcp and GI. Currently patient has no obvious sign of infection and no significant electrolyte imbalance. After hydration is completed, i will discharge patient. Diagnosis Clinical Impression: 1. General weakness 2. Dehydration 3. Abnormal LFTs Disposition: Discharged Follow-up Information Follow up With Specialties Details Why Contact Info Susan Denton MD Family Practice Call in 1 day Follow Up From Emergency Department [address] [place] [address] [id number] 
[phone] [facility] EMERGENCY DEPT Emergency Medicine  If symptoms worsen [address] 
[phone] Bruce Willingham MD Gastroenterology Call in 1 day Follow Up From Emergency Department [facility] [address] [place] [address] [id number] 
[phone] Medication List  
  
START taking these medications   
therapeutic multivitamin tablet Commonly known as:  [facility] Take 1 Tab by mouth daily for 30 doses. ASK your doctor about these medications   
citalopram 10 mg tablet Commonly known as:  Laurcaroline Cor[place]uckles Take 1 Tab by mouth daily. clonazePAM 1 mg tablet Commonly known as:  KlonoPIN 
1/2-1 tablet twice daily prn acute stressful situations 
  
methIMAzole 5 mg tablet Commonly known as:  TAPAZOLE 
  
tadalafil 20 mg tablet Commonly known as:  CIALIS One tablet every 3 days as needed 
  
traZODone 50 mg tablet Commonly known as:  Donzella Eaton Take 1 Tab by mouth nightly. Where to Get Your Medications Information about where to get these medications is not yet available Ask your nurse or doctor about these medications · therapeutic multivitamin tablet 
  
 
_______________________________ Scribe Attestation Gabby Dickson acting as a scribe for and in the presence of Marianna Borges MD     
January 02, 2019 at 1:54 PM 
    
Provider Attestation:     
I personally performed the services described in the documentation, reviewed the documentation, as recorded by the scribe in my presence, and it accurately and completely records my words and actions. January 02, 2019 at 1:54 PM - Marianna Borges MD   
 
 
_______________________________

## 2019-01-02 NOTE — ED NOTES
Purposeful rounding completed: 
 
Side rails up x 1:  YES Bed in low position and wheels locked: YES Call bell within reach: YES Comfort addressed: YES Toileting needs addressed: YES Plan of care reviewed/updated with patient and or family members: YES 
IV site assessed: YES Pain assessed and addressed: YES, 0

## 2019-01-02 NOTE — ED NOTES
I have reviewed discharge instructions with the patient. The patient verbalized understanding. Patient armband removed and shredded. Patient will redress self and ambulate independently out of care area

## 2019-01-03 DIAGNOSIS — F41.1 GENERALIZED ANXIETY DISORDER: ICD-10-CM

## 2019-01-03 DIAGNOSIS — G47.00 INSOMNIA, UNSPECIFIED TYPE: Primary | ICD-10-CM

## 2019-01-03 LAB
ATRIAL RATE: 96 BPM
CALCULATED P AXIS, ECG09: 70 DEGREES
CALCULATED R AXIS, ECG10: 56 DEGREES
CALCULATED T AXIS, ECG11: 72 DEGREES
DIAGNOSIS, 93000: NORMAL
P-R INTERVAL, ECG05: 154 MS
Q-T INTERVAL, ECG07: 362 MS
QRS DURATION, ECG06: 82 MS
QTC CALCULATION (BEZET), ECG08: 457 MS
VENTRICULAR RATE, ECG03: 96 BPM

## 2019-01-03 RX ORDER — CLONAZEPAM 1 MG/1
TABLET ORAL
Qty: 15 TAB | Refills: 0 | Status: SHIPPED | OUTPATIENT
Start: 2019-01-03 | End: 2019-01-23

## 2019-01-03 RX ORDER — ESZOPICLONE 3 MG/1
3 TABLET, FILM COATED ORAL
Qty: 30 TAB | Refills: 2 | Status: SHIPPED | OUTPATIENT
Start: 2019-01-03 | End: 2021-09-13

## 2019-01-03 NOTE — TELEPHONE ENCOUNTER
Received call from pt requesting a refill of his Klonopin. Pt was advised that refill request take 24-48hrs and we would call him when script is ready for p/u. Pt then stated that he wanted to inform Dr. Idris Jain that the Trazodone that was prescribed is not working and wants to know if there is something else that he can prescribe. I advised pt that I would send his request to Dr. Idris Jain. Pt had no further questions or concerns.

## 2019-01-23 ENCOUNTER — OFFICE VISIT (OUTPATIENT)
Dept: FAMILY MEDICINE CLINIC | Age: 44
End: 2019-01-23

## 2019-01-23 VITALS
BODY MASS INDEX: 18.89 KG/M2 | DIASTOLIC BLOOD PRESSURE: 87 MMHG | HEART RATE: 125 BPM | WEIGHT: 163.3 LBS | TEMPERATURE: 98.5 F | SYSTOLIC BLOOD PRESSURE: 142 MMHG | RESPIRATION RATE: 16 BRPM | HEIGHT: 78 IN | OXYGEN SATURATION: 98 %

## 2019-01-23 DIAGNOSIS — J06.9 VIRAL URI WITH COUGH: Primary | ICD-10-CM

## 2019-01-23 DIAGNOSIS — J01.00 ACUTE NON-RECURRENT MAXILLARY SINUSITIS: ICD-10-CM

## 2019-01-23 RX ORDER — AMOXICILLIN 875 MG/1
875 TABLET, FILM COATED ORAL 2 TIMES DAILY
Qty: 20 TAB | Refills: 0 | Status: SHIPPED | OUTPATIENT
Start: 2019-01-23 | End: 2019-02-02

## 2019-01-23 RX ORDER — HYDROCODONE POLISTIREX AND CHLORPHENIRAMINE POLISTIREX 10; 8 MG/5ML; MG/5ML
1 SUSPENSION, EXTENDED RELEASE ORAL
Qty: 60 ML | Refills: 0 | Status: SHIPPED | OUTPATIENT
Start: 2019-01-23 | End: 2019-02-08 | Stop reason: ALTCHOICE

## 2019-01-23 RX ORDER — PREDNISONE 50 MG/1
50 TABLET ORAL
Qty: 5 TAB | Refills: 0 | Status: SHIPPED | OUTPATIENT
Start: 2019-01-23 | End: 2019-01-28

## 2019-01-23 NOTE — LETTER
NOTIFICATION RETURN TO WORK / SCHOOL 
 
1/23/2019 11:45 AM 
 
Mr. Elayne Chan [address] [facility] [address] [phone] To Whom It May Concern: 
 
Elayne Chan is currently under the care of [address] [facility]. He will return to work/school on: 1/25/2019. Excuse 1/22/19-1/24/19 for medical reasons If there are questions or concerns please have the patient contact our office. Sincerely, Warden Sheryl MD

## 2019-01-23 NOTE — PATIENT INSTRUCTIONS
Take prednisone every morning for 5 days. This will help the cough and congestion. Take the antibiotic for 10 days for the presumed sinus infection. Take the cough medication twice daily, this will definitely help the cough. Recheck Monday if you're not a lot better

## 2019-01-23 NOTE — PROGRESS NOTES
HISTORY OF PRESENT ILLNESS Bre Freed is a 37 y.o. male. Phoenix Parker is here because of a cough for 3-4 days, laryngitis, chills, and yesterday he awoke with his L eye crusty. He did not get a flu shot. Cough is semi-productive. No GI sx. ROS Physical Exam  
Constitutional: He appears well-developed and well-nourished. HENT:  
Right Ear: Tympanic membrane, external ear and ear canal normal.  
Left Ear: Tympanic membrane, external ear and ear canal normal.  
Nose: Nose normal.  
Mouth/Throat: Uvula is midline, oropharynx is clear and moist and mucous membranes are normal. No posterior oropharyngeal erythema. Eyes: Conjunctivae are normal. Pupils are equal, round, and reactive to light. Right conjunctiva is not injected. Left conjunctiva is not injected. Neck: Neck supple. No thyromegaly present. Cardiovascular: Normal rate and regular rhythm. Pulmonary/Chest: Effort normal and breath sounds normal. No respiratory distress. He has no wheezes. He has no rales. Listening to his lungs causes coughing spasms. Abdominal: He exhibits no distension. There is no tenderness. Lymphadenopathy:  
  He has no cervical adenopathy. Skin: No rash noted. Nursing note and vitals reviewed. ASSESSMENT and PLAN 
  ICD-10-CM ICD-9-CM 1. Viral URI with cough J06.9 465.9 predniSONE (DELTASONE) 50 mg tablet  
 B97.89  chlorpheniramine-HYDROcodone (TUSSIONEX) 10-8 mg/5 mL suspension 2. Acute non-recurrent maxillary sinusitis J01.00 461.0 amoxicillin (AMOXIL) 875 mg tablet  
   predniSONE (DELTASONE) 50 mg tablet

## 2019-01-23 NOTE — PROGRESS NOTES
Rm 1 
Patient presents to the clinic c/o cold symptoms of cough with phlegm making him lose voice x 4 days and eye drainage of left eye mostly in mornings. Depression Screening: PHQ over the last two weeks 1/23/2019 11/1/2018 10/29/2018 Little interest or pleasure in doing things Not at all Not at all Not at all Feeling down, depressed, irritable, or hopeless Not at all Not at all Several days Total Score PHQ 2 0 0 1 Learning Assessment: 
Learning Assessment 10/29/2018 PRIMARY LEARNER Patient HIGHEST LEVEL OF EDUCATION - PRIMARY LEARNER  > 4 YEARS OF COLLEGE  
BARRIERS PRIMARY LEARNER NONE  
CO-LEARNER CAREGIVER No  
PRIMARY LANGUAGE ENGLISH  
LEARNER PREFERENCE PRIMARY VIDEOS  
  PICTURES  
ANSWERED BY patient RELATIONSHIP SELF Abuse Screening: No flowsheet data found. Health Maintenance reviewed and discussed per provider: yes Coordination of Care: 1. Have you been to the ER, urgent care clinic since your last visit? Hospitalized since your last visit? no 
 
2. Have you seen or consulted any other health care providers outside of the [address] [place] [address] since your last visit? Include any pap smears or colon screening.  No

## 2019-01-29 ENCOUNTER — OFFICE VISIT (OUTPATIENT)
Dept: FAMILY MEDICINE CLINIC | Age: 44
End: 2019-01-29

## 2019-01-29 VITALS
TEMPERATURE: 98.1 F | BODY MASS INDEX: 19.48 KG/M2 | HEIGHT: 78 IN | DIASTOLIC BLOOD PRESSURE: 84 MMHG | WEIGHT: 168.4 LBS | OXYGEN SATURATION: 97 % | SYSTOLIC BLOOD PRESSURE: 127 MMHG | RESPIRATION RATE: 16 BRPM | HEART RATE: 116 BPM

## 2019-01-29 DIAGNOSIS — K05.10 GINGIVITIS: Primary | ICD-10-CM

## 2019-01-29 NOTE — PROGRESS NOTES
HISTORY OF PRESENT ILLNESS Elayne Chan is a 37 y.o. male. Law Saucedo is here because of swelling of his gums in front for a couple of days. No fever, chills. It hurts when he touches it. Sinuses are getting better, he still has a few days left of his Amoxil. Review of Systems Constitutional: Negative for chills and fever. HENT: Negative for congestion, hearing loss, nosebleeds and sore throat. Swollen gums Eyes: Negative for blurred vision and double vision. Respiratory: Negative for cough and shortness of breath. Cardiovascular: Negative for chest pain and palpitations. Gastrointestinal: Negative for abdominal pain, nausea and vomiting. Genitourinary: Negative for dysuria and urgency. Musculoskeletal: Negative for myalgias. Skin: Negative for itching and rash. Neurological: Negative for headaches. Psychiatric/Behavioral: Negative for depression and suicidal ideas. The patient is nervous/anxious and has insomnia. Physical Exam  
Constitutional: He is oriented to person, place, and time. He appears well-developed and well-nourished. HENT:  
Mild redness of gums above R front teeth. No abscess. Eyes: Pupils are equal, round, and reactive to light. Neck: Neck supple. Cardiovascular: Normal rate, regular rhythm and normal heart sounds. Pulmonary/Chest: Effort normal and breath sounds normal. No respiratory distress. He has no wheezes. Abdominal: Soft. There is no tenderness. Lymphadenopathy:  
  He has no cervical adenopathy. Neurological: He is alert and oriented to person, place, and time. Nursing note and vitals reviewed. ASSESSMENT and PLAN 
  ICD-10-CM ICD-9-CM 1. Gingivitis K05.10 523.10 magic mouthwash solution 1/30-pt called, said the mouthwash made his lips swell. Will send rx in for prednisone

## 2019-01-29 NOTE — PROGRESS NOTES
Rm 1 
Patient presents to the clinic c/o swollen gums and pain x 2 days. Depression Screening: PHQ over the last two weeks 1/29/2019 1/23/2019 11/1/2018 10/29/2018 Little interest or pleasure in doing things Not at all Not at all Not at all Not at all Feeling down, depressed, irritable, or hopeless Not at all Not at all Not at all Several days Total Score PHQ 2 0 0 0 1 Learning Assessment: 
Learning Assessment 10/29/2018 PRIMARY LEARNER Patient HIGHEST LEVEL OF EDUCATION - PRIMARY LEARNER  > 4 YEARS OF COLLEGE  
BARRIERS PRIMARY LEARNER NONE  
CO-LEARNER CAREGIVER No  
PRIMARY LANGUAGE ENGLISH  
LEARNER PREFERENCE PRIMARY VIDEOS  
  PICTURES  
ANSWERED BY patient RELATIONSHIP SELF Abuse Screening: No flowsheet data found. Health Maintenance reviewed and discussed per provider: yes Coordination of Care: 1. Have you been to the ER, urgent care clinic since your last visit? Hospitalized since your last visit? no 
 
2. Have you seen or consulted any other health care providers outside of the [address] [place] [address] since your last visit? Include any pap smears or colon screening.  No

## 2019-01-30 ENCOUNTER — TELEPHONE (OUTPATIENT)
Dept: FAMILY MEDICINE CLINIC | Age: 44
End: 2019-01-30

## 2019-01-30 RX ORDER — PREDNISONE 50 MG/1
50 TABLET ORAL
Qty: 5 TAB | Refills: 0 | Status: SHIPPED | OUTPATIENT
Start: 2019-01-30 | End: 2019-02-08 | Stop reason: SDUPTHER

## 2019-01-30 NOTE — TELEPHONE ENCOUNTER
Patient stated that the mouthwash he was taking made his lips and gums swollen. Patient asking what he should do and if there is anything he can take to let the swelling go down.

## 2019-02-08 ENCOUNTER — OFFICE VISIT (OUTPATIENT)
Dept: FAMILY MEDICINE CLINIC | Age: 44
End: 2019-02-08

## 2019-02-08 VITALS
SYSTOLIC BLOOD PRESSURE: 144 MMHG | HEIGHT: 78 IN | HEART RATE: 108 BPM | BODY MASS INDEX: 19.38 KG/M2 | RESPIRATION RATE: 20 BRPM | WEIGHT: 167.5 LBS | DIASTOLIC BLOOD PRESSURE: 94 MMHG | TEMPERATURE: 98.3 F | OXYGEN SATURATION: 100 %

## 2019-02-08 DIAGNOSIS — F41.1 GENERALIZED ANXIETY DISORDER: ICD-10-CM

## 2019-02-08 DIAGNOSIS — K05.219 ABSCESS OF UPPER GUM: Primary | ICD-10-CM

## 2019-02-08 DIAGNOSIS — J01.00 ACUTE NON-RECURRENT MAXILLARY SINUSITIS: ICD-10-CM

## 2019-02-08 DIAGNOSIS — F41.9 ANXIETY: ICD-10-CM

## 2019-02-08 RX ORDER — CITALOPRAM 20 MG/1
20 TABLET, FILM COATED ORAL DAILY
Qty: 30 TAB | Refills: 2 | Status: SHIPPED | OUTPATIENT
Start: 2019-02-08 | End: 2019-03-11

## 2019-02-08 RX ORDER — AMOXICILLIN 875 MG/1
875 TABLET, FILM COATED ORAL 2 TIMES DAILY
Qty: 20 TAB | Refills: 0 | OUTPATIENT
Start: 2019-02-08 | End: 2019-02-18

## 2019-02-08 RX ORDER — PREDNISONE 50 MG/1
50 TABLET ORAL
Qty: 5 TAB | Refills: 0 | OUTPATIENT
Start: 2019-02-08

## 2019-02-08 RX ORDER — PREDNISONE 50 MG/1
50 TABLET ORAL
Qty: 5 TAB | Refills: 0 | Status: SHIPPED | OUTPATIENT
Start: 2019-02-08 | End: 2019-03-11 | Stop reason: ALTCHOICE

## 2019-02-08 RX ORDER — CLONAZEPAM 1 MG/1
TABLET ORAL
COMMUNITY
Start: 2019-01-04 | End: 2019-02-08 | Stop reason: SDUPTHER

## 2019-02-08 RX ORDER — DOXYCYCLINE 100 MG/1
100 CAPSULE ORAL 2 TIMES DAILY
Qty: 14 CAP | Refills: 0 | Status: SHIPPED | OUTPATIENT
Start: 2019-02-08 | End: 2019-02-15

## 2019-02-08 RX ORDER — CLONAZEPAM 1 MG/1
.5-1 TABLET ORAL
Qty: 15 TAB | Refills: 0 | Status: SHIPPED | OUTPATIENT
Start: 2019-02-08 | End: 2019-03-08 | Stop reason: SDUPTHER

## 2019-02-08 RX ORDER — CLONAZEPAM 1 MG/1
TABLET ORAL
Qty: 15 TAB | Refills: 0 | OUTPATIENT
Start: 2019-02-08

## 2019-02-08 NOTE — PROGRESS NOTES
HISTORY OF PRESENT ILLNESS  Vish Condon is a 53 y.o. male. HPI  Mr. Kal Castano presents for medication refills. 1) Anxiety - he desires a refill of his Klonopin. He has #4 leftover from his last Rx of 15 given to him on 1/4/19 - viewed in bottle. He states he takes 1/2-1 tab ~3 times per week when he feels especially overwhelmed and anxious. He is taking Celexa 10 mg and credits it with some but limited benefit on his anxiety. 2) He desires a refill of Amoxicillin and Prednisone. He c/o recurrence of a gum abscess, first noticed last night and worsened this morning. He reports with his prior gum issue, the Prednisone helped him immensely. He has not yet consulted with a dentist.     Review of Systems   Constitutional: Negative for chills and fever. Psychiatric/Behavioral: The patient is nervous/anxious. Visit Vitals  BP (!) 144/94 (BP 1 Location: Left arm, BP Patient Position: Sitting) Comment: manual   Pulse (!) 108   Temp 98.3 °F (36.8 °C) (Oral)   Resp 20   Ht 6' 7\" (2.007 m)   Wt 167 lb 8 oz (76 kg)   SpO2 100%   BMI 18.87 kg/m²       Physical Exam   Constitutional: He is oriented to person, place, and time. He appears well-developed and well-nourished. No distress. HENT:   Head: Normocephalic and atraumatic. Right Ear: Tympanic membrane, external ear and ear canal normal.   Left Ear: Tympanic membrane, external ear and ear canal normal.   Nose: Nose normal.   Mouth/Throat: Uvula is midline, oropharynx is clear and moist and mucous membranes are normal. Dental abscesses (Gum over right upper central incisor with a small fluctuant abscess that easily expresses purulent fluid with minimal palpation. Fluid expressed - fluctuance resolved. ) present. No oropharyngeal exudate, posterior oropharyngeal edema, posterior oropharyngeal erythema or tonsillar abscesses. Eyes: Conjunctivae are normal. Pupils are equal, round, and reactive to light. No scleral icterus. Neck: Neck supple. Cardiovascular: Normal rate, regular rhythm and normal heart sounds. Exam reveals no gallop. No murmur heard. Pulses:       Dorsalis pedis pulses are 2+ on the right side, and 2+ on the left side. Posterior tibial pulses are 2+ on the right side, and 2+ on the left side. No pedal edema. Pulmonary/Chest: Effort normal and breath sounds normal. No respiratory distress. He has no decreased breath sounds. He has no wheezes. He has no rhonchi. He has no rales. Lymphadenopathy:        Head (right side): No submandibular and no tonsillar adenopathy present. Head (left side): No submandibular and no tonsillar adenopathy present. He has no cervical adenopathy. Right: No supraclavicular adenopathy present. Left: No supraclavicular adenopathy present. Neurological: He is alert and oriented to person, place, and time. Skin: Skin is warm and dry. Psychiatric: He has a normal mood and affect. His speech is normal.       ASSESSMENT and PLAN  Orders Placed This Encounter    clonazePAM (KLONOPIN) 1 mg tablet     Sig: Take 0.5-1 Tabs by mouth two (2) times daily as needed. Max Daily Amount: 2 mg. Dispense:  15 Tab     Refill:  0    citalopram (CELEXA) 20 mg tablet     Sig: Take 1 Tab by mouth daily. Dispense:  30 Tab     Refill:  2    predniSONE (DELTASONE) 50 mg tablet     Sig: Take 1 Tab by mouth every morning. With food     Dispense:  5 Tab     Refill:  0    doxycycline (VIBRAMYCIN) 100 mg capsule     Sig: Take 1 Cap by mouth two (2) times a day for 7 days. Dispense:  14 Cap     Refill:  0     Diagnoses and all orders for this visit:    1. Abscess of upper gum  -     predniSONE (DELTASONE) 50 mg tablet; Take 1 Tab by mouth every morning. With food   - Patient to hold. Advised patient that with the material I expressed today and the antibiotic he will start, I do not expect him to need this and wish to avoid it if possible. He agrees.    -     doxycycline (VIBRAMYCIN) 100 mg capsule; Take 1 Cap by mouth two (2) times a day for 7 days. - Discussed the importance of routine dental care. Given his gingivitis, I strongly advised him of seeing a dentist. He will schedule this. 2. Anxiety  -     clonazePAM (KLONOPIN) 1 mg tablet; Take 0.5-1 Tabs by mouth two (2) times daily as needed. Max Daily Amount: 2 mg.  -     citalopram (CELEXA) 20 mg tablet; Take 1 Tab by mouth daily.   - Dosage increase.  - Discussed with patient our goal of appropriately controlling his anxiety with maintenance medication such that he requires less of the Klonopin. Discussed that Klonopin use serves somewhat as our guide as to the level of control of his anxiety. Follow-up Disposition:  Return in about 1 month (around 3/8/2019).

## 2019-02-08 NOTE — PROGRESS NOTES
Rm 1  Patient presents to the clinic for medication refill and to check on gums due to swelling again and pus. Depression Screening:  PHQ over the last two weeks 1/29/2019 1/23/2019 11/1/2018 10/29/2018   Little interest or pleasure in doing things Not at all Not at all Not at all Not at all   Feeling down, depressed, irritable, or hopeless Not at all Not at all Not at all Several days   Total Score PHQ 2 0 0 0 1       Learning Assessment:  Learning Assessment 10/29/2018   PRIMARY LEARNER Patient   HIGHEST LEVEL OF EDUCATION - PRIMARY LEARNER  > 4 YEARS OF COLLEGE   BARRIERS PRIMARY LEARNER NONE   CO-LEARNER CAREGIVER No   PRIMARY LANGUAGE ENGLISH   LEARNER PREFERENCE PRIMARY VIDEOS     PICTURES   ANSWERED BY patient   RELATIONSHIP SELF       Abuse Screening:  No flowsheet data found. Health Maintenance reviewed and discussed per provider: yes     Coordination of Care:    1. Have you been to the ER, urgent care clinic since your last visit? Hospitalized since your last visit? no    2. Have you seen or consulted any other health care providers outside of the [address] [place] [address] since your last visit? Include any pap smears or colon screening.  No    Requested Prescriptions     Pending Prescriptions Disp Refills    clonazePAM (KLONOPIN) 1 mg tablet

## 2019-03-08 DIAGNOSIS — F41.9 ANXIETY: ICD-10-CM

## 2019-03-08 RX ORDER — CLONAZEPAM 1 MG/1
.5-1 TABLET ORAL
Qty: 12 TAB | Refills: 0 | Status: SHIPPED | OUTPATIENT
Start: 2019-03-08 | End: 2019-03-25 | Stop reason: SDUPTHER

## 2019-03-08 NOTE — TELEPHONE ENCOUNTER
Patient in the office to  Clonazepam. He is due for a visit and schedules this for Monday, 3/11/19. Will give Rx today and will see patient Monday. Nursing to provide psychiatry info for patient.

## 2019-03-11 ENCOUNTER — OFFICE VISIT (OUTPATIENT)
Dept: FAMILY MEDICINE CLINIC | Age: 44
End: 2019-03-11

## 2019-03-11 VITALS
TEMPERATURE: 98.9 F | BODY MASS INDEX: 18.51 KG/M2 | DIASTOLIC BLOOD PRESSURE: 106 MMHG | HEIGHT: 78 IN | HEART RATE: 108 BPM | OXYGEN SATURATION: 100 % | SYSTOLIC BLOOD PRESSURE: 154 MMHG | WEIGHT: 160 LBS | RESPIRATION RATE: 16 BRPM

## 2019-03-11 DIAGNOSIS — F41.9 ANXIETY: ICD-10-CM

## 2019-03-11 RX ORDER — CITALOPRAM 40 MG/1
40 TABLET, FILM COATED ORAL DAILY
Qty: 30 TAB | Refills: 5 | Status: SHIPPED | OUTPATIENT
Start: 2019-03-11 | End: 2021-09-13 | Stop reason: ALTCHOICE

## 2019-03-11 NOTE — PROGRESS NOTES
Patient presents to the clinic for a follow up on anxiety. Patient would like a letter for working stating he is unable to work. Patient would also like to discuss left leg pain. Patient complains of numbness.

## 2019-03-11 NOTE — LETTER
NOTIFICATION RETURN TO WORK / SCHOOL 
 
3/11/2019 4:47 PM 
 
Mr. Lawrence Norton [address] [facility] [id number] To Whom It May Concern: 
 
Lawrence Norton is currently under the care of [address] [facility]. Given the severity of Mr. Александр Sagastume' current medical condition, I recommend he proceed with his resignation as soon as possible. I do not recommend he return to the classroom at present. If there are questions or concerns please have the patient contact our office.  
 
 
 
Sincerely, 
 
 
 
 
SATURNINO Chavez

## 2019-03-11 NOTE — PROGRESS NOTES
HISTORY OF PRESENT ILLNESS  Pieter Castro is a 53 y.o. male. HPI  Mr. Desir Later presents today visibly shaking with c/o extreme anxiety. He states that his work situation has declined to the point where he is suffering from severe anxiety. He continues to lose weight. He reports his hands shake leaving work and when he thinks about work. He c/o nausea with vomiting in the mornings. He had to leave work early 1 week ago due to extreme anxiety. He denies SI/HI. He has not had any verbal altercations at work. 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 He presents today for a medical justification of his resignation. Review of Systems   Constitutional: Positive for weight loss. Gastrointestinal: Positive for diarrhea, nausea and vomiting. Musculoskeletal:        (+) leg pain - improving   Psychiatric/Behavioral: Negative for suicidal ideas. The patient is nervous/anxious and has insomnia. Visit Vitals  BP (!) 165/110 (BP 1 Location: Right arm, BP Patient Position: Sitting)   Pulse (!) 111   Temp 98.9 °F (37.2 °C) (Oral)   Resp 16   Ht 6' 7\" (2.007 m)   Wt 160 lb (72.6 kg)   SpO2 100%   BMI 18.02 kg/m²       Physical Exam   Constitutional: He is oriented to person, place, and time. He appears well-developed and well-nourished. No distress. HENT:   Head: Normocephalic and atraumatic. Right Ear: Tympanic membrane, external ear and ear canal normal.   Left Ear: Tympanic membrane, external ear and ear canal normal.   Nose: Nose normal.   Mouth/Throat: Uvula is midline, oropharynx is clear and moist and mucous membranes are normal. No oropharyngeal exudate, posterior oropharyngeal edema, posterior oropharyngeal erythema or tonsillar abscesses. Eyes: Conjunctivae are normal. Pupils are equal, round, and reactive to light. No scleral icterus. Neck: Neck supple. Cardiovascular: Normal rate, regular rhythm and normal heart sounds. Exam reveals no gallop. No murmur heard.   Pulses:       Dorsalis pedis pulses are 2+ on the right side, and 2+ on the left side. Posterior tibial pulses are 2+ on the right side, and 2+ on the left side. No pedal edema. Pulmonary/Chest: Effort normal and breath sounds normal. No respiratory distress. He has no decreased breath sounds. He has no wheezes. He has no rhonchi. He has no rales. Lymphadenopathy:        Head (right side): No submandibular and no tonsillar adenopathy present. Head (left side): No submandibular and no tonsillar adenopathy present. He has no cervical adenopathy. Right: No supraclavicular adenopathy present. Left: No supraclavicular adenopathy present. Neurological: He is alert and oriented to person, place, and time. He displays tremor (with active movements). Skin: Skin is warm and dry. Psychiatric: His speech is normal and behavior is normal. Judgment normal. His mood appears anxious. Thought content is not paranoid and not delusional. Cognition and memory are normal. He expresses no homicidal and no suicidal ideation. He expresses no suicidal plans and no homicidal plans. ASSESSMENT and PLAN  Diagnoses and all orders for this visit:    1. Anxiety  -     citalopram (CELEXA) 40 mg tablet; Take 1 Tab by mouth daily.   - Dosage increase from 20 mg.  - Continue Lunesta and Klonopin prn.   - Given weight loss, tachycardia, elevated BP, physical symptoms, and extreme anxiety, I agree with patient's decision to resign. I think it would be safest for him and for those around him if he does not return to the classroom. Letter given to patient stating this. Follow-up Disposition:  Return in about 2 weeks (around 3/25/2019) for follow-up anxiety.

## 2019-03-25 ENCOUNTER — OFFICE VISIT (OUTPATIENT)
Dept: INTERNAL MEDICINE CLINIC | Age: 44
End: 2019-03-25

## 2019-03-25 VITALS
OXYGEN SATURATION: 98 % | RESPIRATION RATE: 14 BRPM | BODY MASS INDEX: 18.51 KG/M2 | HEIGHT: 78 IN | WEIGHT: 160 LBS | SYSTOLIC BLOOD PRESSURE: 124 MMHG | DIASTOLIC BLOOD PRESSURE: 84 MMHG | HEART RATE: 106 BPM | TEMPERATURE: 98.2 F

## 2019-03-25 DIAGNOSIS — G47.00 INSOMNIA, UNSPECIFIED TYPE: ICD-10-CM

## 2019-03-25 DIAGNOSIS — M79.672 BILATERAL FOOT PAIN: ICD-10-CM

## 2019-03-25 DIAGNOSIS — F41.9 ANXIETY: Primary | ICD-10-CM

## 2019-03-25 DIAGNOSIS — M79.671 BILATERAL FOOT PAIN: ICD-10-CM

## 2019-03-25 DIAGNOSIS — M26.621 TMJ TENDERNESS, RIGHT: ICD-10-CM

## 2019-03-25 RX ORDER — MELOXICAM 15 MG/1
15 TABLET ORAL DAILY
Qty: 30 TAB | Refills: 1 | Status: SHIPPED | OUTPATIENT
Start: 2019-03-25 | End: 2019-07-30 | Stop reason: SDUPTHER

## 2019-03-25 RX ORDER — CLONAZEPAM 1 MG/1
.5-1 TABLET ORAL
Qty: 24 TAB | Refills: 0 | Status: SHIPPED | OUTPATIENT
Start: 2019-03-25 | End: 2021-09-13 | Stop reason: SDUPTHER

## 2019-03-25 NOTE — PROGRESS NOTES
Patient presents to the clinic for follow up on his anxiety. Patient would like to discuss medication for IBS and a medication refill.

## 2019-03-25 NOTE — PROGRESS NOTES
HISTORY OF PRESENT ILLNESS  Joana Griffiths is a 53 y.o. male. HPI  Mr. Tae Vu presents for follow-up anxiety. He is much improved from his last visit. He reports he has not required Klonopin since last Tuesday. He does request a refill for prn use over the next several months. He anticipates loss of insurance soon. He c/o increased reflux since his last visit, and he attributes this to recent stress. He started an OTC reflux medication, possibly generic Zantac or Pepcid. Taking these once daily has worked well to control his symptoms. He has not required Lunesta in ~3 weeks but has it available to him if needed. He c/o right-sided jaw pain and bilateral foot pain. He follows with podiatry and reports he needs to follow-up for insoles. He reports he was told he has a pinched nerve in marcus feet similar to a pinched nerve in his back. He c/o medial ankle tenderness with palpation that exacerbates bilateral foot paresthesias. He has not tried an NSAID regimen. Review of Systems   Constitutional: Negative for weight loss (since last visit). Gastrointestinal: Positive for heartburn (improved with OTC meds). Psychiatric/Behavioral: The patient is nervous/anxious (improved). The patient does not have insomnia. Visit Vitals  /84 (BP 1 Location: Left arm, BP Patient Position: Sitting)   Pulse (!) 106   Temp 98.2 °F (36.8 °C) (Oral)   Resp 14   Ht 6' 7\" (2.007 m)   Wt 160 lb (72.6 kg)   SpO2 98%   BMI 18.02 kg/m²       Physical Exam   Constitutional: He is oriented to person, place, and time. He appears well-developed and well-nourished. No distress. HENT:   Head: Normocephalic and atraumatic.    Right Ear: Tympanic membrane, external ear and ear canal normal.   Left Ear: Tympanic membrane, external ear and ear canal normal.   Nose: Nose normal.   Mouth/Throat: Uvula is midline, oropharynx is clear and moist and mucous membranes are normal. No oropharyngeal exudate, posterior oropharyngeal edema, posterior oropharyngeal erythema or tonsillar abscesses. Eyes: Pupils are equal, round, and reactive to light. Conjunctivae are normal. No scleral icterus. Neck: Neck supple. Cardiovascular: Normal rate, regular rhythm and normal heart sounds. Exam reveals no gallop. No murmur heard. Pulses:       Dorsalis pedis pulses are 2+ on the right side, and 2+ on the left side. Posterior tibial pulses are 2+ on the right side, and 2+ on the left side. No pedal edema. Pulmonary/Chest: Effort normal and breath sounds normal. No respiratory distress. He has no decreased breath sounds. He has no wheezes. He has no rhonchi. He has no rales. Musculoskeletal:        Feet:    Lymphadenopathy:        Head (right side): No submandibular and no tonsillar adenopathy present. Head (left side): No submandibular and no tonsillar adenopathy present. He has no cervical adenopathy. Right: No supraclavicular adenopathy present. Left: No supraclavicular adenopathy present. Neurological: He is alert and oriented to person, place, and time. Skin: Skin is warm and dry. Psychiatric: He has a normal mood and affect. His speech is normal.       ASSESSMENT and PLAN  Orders Placed This Encounter    meloxicam (MOBIC) 15 mg tablet     Sig: Take 1 Tab by mouth daily. Take with food. Dispense:  30 Tab     Refill:  1    clonazePAM (KLONOPIN) 1 mg tablet     Sig: Take 0.5-1 Tabs by mouth two (2) times daily as needed (anxiety). Max Daily Amount: 2 mg. Dispense:  24 Tab     Refill:  0     Diagnoses and all orders for this visit:    1. Anxiety  -     clonazePAM (KLONOPIN) 1 mg tablet; Take 0.5-1 Tabs by mouth two (2) times daily as needed (anxiety). Max Daily Amount: 2 mg. - Larger amount given - discussed with patient this should last longer duration of time. Now with stress significantly reduced, we both expect significantly less frequent use.      2. Insomnia, unspecified type  - Controlled. Continue prn Lunesta. 3. Bilateral foot pain  -     meloxicam (MOBIC) 15 mg tablet; Take 1 Tab by mouth daily. Take with food. 4. TMJ tenderness, right  -     meloxicam (MOBIC) 15 mg tablet; Take 1 Tab by mouth daily. Take with food. Follow-up and Dispositions    · Return if symptoms worsen or fail to improve.

## 2019-04-15 ENCOUNTER — TELEPHONE (OUTPATIENT)
Dept: INTERNAL MEDICINE CLINIC | Age: 44
End: 2019-04-15

## 2019-04-15 RX ORDER — NORTRIPTYLINE HYDROCHLORIDE 10 MG/1
10-20 CAPSULE ORAL
Qty: 60 CAP | Refills: 3 | Status: SHIPPED | OUTPATIENT
Start: 2019-04-15 | End: 2021-09-13

## 2019-04-15 RX ORDER — DOXYCYCLINE 100 MG/1
100 TABLET ORAL 2 TIMES DAILY
Qty: 20 TAB | Refills: 0 | Status: SHIPPED | OUTPATIENT
Start: 2019-04-15 | End: 2019-04-25

## 2019-04-15 NOTE — TELEPHONE ENCOUNTER
Nortriptyline HCL 10 mg    Take 1-2 cap by mouth at bedtime as needed. Patient states he takes nortriptyline for IBS.

## 2019-04-15 NOTE — TELEPHONE ENCOUNTER
Patient called the office requesting a medication refill for doxycycline. Patient states he believes he has another abscess of his gums. Patient was prescribed this medication in February. Patient is also requesting a prescription for his stomach. Patient was advised to call me back with the name of the medication. Patient verbalized understanding and had no further questions.

## 2019-04-22 RX ORDER — TADALAFIL 20 MG/1
TABLET ORAL
Qty: 8 TAB | Refills: 2 | Status: SHIPPED | OUTPATIENT
Start: 2019-04-22 | End: 2019-07-30 | Stop reason: SDUPTHER

## 2019-04-22 NOTE — TELEPHONE ENCOUNTER
Patient is requesting a refill of his medication.     Requested Prescriptions     Pending Prescriptions Disp Refills    tadalafil (CIALIS) 20 mg tablet 8 Tab 2     Sig: One tablet every 3 days as needed

## 2019-07-30 ENCOUNTER — OFFICE VISIT (OUTPATIENT)
Dept: INTERNAL MEDICINE CLINIC | Age: 44
End: 2019-07-30

## 2019-07-30 VITALS
WEIGHT: 192 LBS | OXYGEN SATURATION: 96 % | BODY MASS INDEX: 22.21 KG/M2 | TEMPERATURE: 98 F | HEIGHT: 78 IN | HEART RATE: 99 BPM | RESPIRATION RATE: 20 BRPM | DIASTOLIC BLOOD PRESSURE: 95 MMHG | SYSTOLIC BLOOD PRESSURE: 142 MMHG

## 2019-07-30 DIAGNOSIS — M79.671 BILATERAL FOOT PAIN: ICD-10-CM

## 2019-07-30 DIAGNOSIS — M26.621 TMJ TENDERNESS, RIGHT: ICD-10-CM

## 2019-07-30 DIAGNOSIS — M79.672 BILATERAL FOOT PAIN: ICD-10-CM

## 2019-07-30 DIAGNOSIS — J06.9 UPPER RESPIRATORY TRACT INFECTION, UNSPECIFIED TYPE: Primary | ICD-10-CM

## 2019-07-30 RX ORDER — AMOXICILLIN AND CLAVULANATE POTASSIUM 875; 125 MG/1; MG/1
1 TABLET, FILM COATED ORAL 2 TIMES DAILY
Qty: 20 TAB | Refills: 0 | Status: SHIPPED | OUTPATIENT
Start: 2019-07-30 | End: 2019-08-09

## 2019-07-30 RX ORDER — MELOXICAM 15 MG/1
15 TABLET ORAL DAILY
Qty: 30 TAB | Refills: 5 | Status: SHIPPED | OUTPATIENT
Start: 2019-07-30 | End: 2021-09-13

## 2019-07-30 RX ORDER — HYDROCODONE POLISTIREX AND CHLORPHENIRAMINE POLISTIREX 10; 8 MG/5ML; MG/5ML
1 SUSPENSION, EXTENDED RELEASE ORAL
Qty: 70 ML | Refills: 0 | Status: SHIPPED | OUTPATIENT
Start: 2019-07-30 | End: 2019-08-06

## 2019-07-30 RX ORDER — TADALAFIL 20 MG/1
TABLET ORAL
Qty: 8 TAB | Refills: 5 | Status: SHIPPED | OUTPATIENT
Start: 2019-07-30 | End: 2019-08-26 | Stop reason: SDUPTHER

## 2019-07-30 NOTE — PROGRESS NOTES
HISTORY OF PRESENT ILLNESS  Bennett Mckinley is a 53 y.o. male. Blood pressure (!) 142/95, pulse 99, temperature 98 °F (36.7 °C), temperature source Oral, resp. rate 20, height 6' 7\" (2.007 m), weight 192 lb (87.1 kg), SpO2 96 %. about 4 days ago noted feeling bad, developed laryngitis  Had a similar episode in the winter which was worse then. 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 Hoarse    The history is provided by the patient. This is a new problem. The current episode started more than 2 days ago. Associated symptoms include cough. Review of Systems   Constitutional: Positive for malaise/fatigue. Negative for chills and fever. HENT: Positive for hoarse voice. Hoarseness     Respiratory: Positive for cough. Physical Exam   Constitutional: He is oriented to person, place, and time. He appears well-developed and well-nourished. No distress. HENT:   Right Ear: Tympanic membrane, external ear and ear canal normal.   Left Ear: Tympanic membrane, external ear and ear canal normal.   Nose: Nose normal. Right sinus exhibits no maxillary sinus tenderness and no frontal sinus tenderness. Left sinus exhibits no maxillary sinus tenderness and no frontal sinus tenderness. Mouth/Throat: Uvula is midline, oropharynx is clear and moist and mucous membranes are normal.   Cardiovascular: Normal rate and regular rhythm. Pulmonary/Chest: Effort normal and breath sounds normal.   Central harshness   Musculoskeletal: He exhibits no edema. Neurological: He is alert and oriented to person, place, and time. Skin: Skin is warm and dry. He is not diaphoretic. Psychiatric: He has a normal mood and affect. Nursing note and vitals reviewed. ASSESSMENT and PLAN    ICD-10-CM ICD-9-CM    1. Upper respiratory tract infection, unspecified type J06.9 465.9 chlorpheniramine-HYDROcodone (TUSSIONEX PENNKINETIC ER) 10-8 mg/5 mL suspension   2.  Bilateral foot pain M79.671 729.5 meloxicam (MOBIC) 15 mg tablet    M79.672     3. TMJ tenderness, right M26.621 524.62 meloxicam (MOBIC) 15 mg tablet       This is likely viral, but pt has similar sxs to last year and says the amoxicillin made him a lot better (there was a sinus component)    Popsicles. Lozenges, don't talk so much! Rest and fluids as much as can. Cover mouth as much as possible.

## 2019-07-30 NOTE — PROGRESS NOTES
ROOM # 5    Natasha Carpenter presents today for   Chief Complaint   Patient presents with    Laryngitis    Breathing Problem    Cough       Natasha Carpenter preferred language for health care discussion is english/other. Is someone accompanying this pt? no    Is the patient using any DME equipment during OV? no    Depression Screening:  3 most recent [facility] Screens 3/11/2019 1/29/2019 1/23/2019 11/1/2018 10/29/2018   Little interest or pleasure in doing things Not at all Not at all Not at all Not at all Not at all   Feeling down, depressed, irritable, or hopeless Several days Not at all Not at all Not at all Several days   Total Score PHQ 2 1 0 0 0 1       Learning Assessment:  Learning Assessment 10/29/2018   PRIMARY LEARNER Patient   HIGHEST LEVEL OF EDUCATION - PRIMARY LEARNER  > 4 YEARS OF COLLEGE   BARRIERS PRIMARY LEARNER NONE   CO-LEARNER CAREGIVER No   PRIMARY LANGUAGE ENGLISH   LEARNER PREFERENCE PRIMARY VIDEOS     PICTURES   ANSWERED BY patient   RELATIONSHIP SELF       Abuse Screening:  No flowsheet data found. Fall Risk  No flowsheet data found. Health Maintenance reviewed and discussed per provider. Yes    Natasha Carpenter is due for   Health Maintenance Due   Topic Date Due    DTaP/Tdap/Td series (1 - Tdap) 02/04/1994     Please order/place referral if appropriate. Advance Directive:  1. Do you have an advance directive in place? Patient Reply: no    2. If not, would you like material regarding how to put one in place? Patient Reply: no    Coordination of Care:  1. Have you been to the ER, urgent care clinic since your last visit? Hospitalized since your last visit? no    2. Have you seen or consulted any other health care providers outside of the [address] [place] [address] since your last visit? Include any pap smears or colon screening.  no

## 2019-07-30 NOTE — PATIENT INSTRUCTIONS
Upper Respiratory Infection (Cold): Care Instructions  Your Care Instructions    An upper respiratory infection, or URI, is an infection of the nose, sinuses, or throat. URIs are spread by coughs, sneezes, and direct contact. The common cold is the most frequent kind of URI. The flu and sinus infections are other kinds of URIs. Almost all URIs are caused by viruses. Antibiotics won't cure them. But you can treat most infections with home care. This may include drinking lots of fluids and taking over-the-counter pain medicine. You will probably feel better in 4 to 10 days. The doctor has checked you carefully, but problems can develop later. If you notice any problems or new symptoms, get medical treatment right away. Follow-up care is a key part of your treatment and safety. Be sure to make and go to all appointments, and call your doctor if you are having problems. It's also a good idea to know your test results and keep a list of the medicines you take. How can you care for yourself at home? · To prevent dehydration, drink plenty of fluids, enough so that your urine is light yellow or clear like water. Choose water and other caffeine-free clear liquids until you feel better. If you have kidney, heart, or liver disease and have to limit fluids, talk with your doctor before you increase the amount of fluids you drink. · Take an over-the-counter pain medicine, such as acetaminophen (Tylenol), ibuprofen (Advil, Motrin), or naproxen (Aleve). Read and follow all instructions on the label. · Before you use cough and cold medicines, check the label. These medicines may not be safe for young children or for people with certain health problems. · Be careful when taking over-the-counter cold or flu medicines and Tylenol at the same time. Many of these medicines have acetaminophen, which is Tylenol. Read the labels to make sure that you are not taking more than the recommended dose.  Too much acetaminophen (Tylenol) can be harmful. · Get plenty of rest.  · Do not smoke or allow others to smoke around you. If you need help quitting, talk to your doctor about stop-smoking programs and medicines. These can increase your chances of quitting for good. When should you call for help? Call 911 anytime you think you may need emergency care. For example, call if:    · You have severe trouble breathing.    Call your doctor now or seek immediate medical care if:    · You seem to be getting much sicker.     · You have new or worse trouble breathing.     · You have a new or higher fever.     · You have a new rash.    Watch closely for changes in your health, and be sure to contact your doctor if:    · You have a new symptom, such as a sore throat, an earache, or sinus pain.     · You cough more deeply or more often, especially if you notice more mucus or a change in the color of your mucus.     · You do not get better as expected. Where can you learn more? Go to http://harshil-elmer.info/. Enter K127 in the search box to learn more about \"Upper Respiratory Infection (Cold): Care Instructions. \"  Current as of: September 5, 2018  Content Version: 12.1  © 1932-3861 Healthwise, Incorporated. Care instructions adapted under license by [employer] (which disclaims liability or warranty for this information). If you have questions about a medical condition or this instruction, always ask your healthcare professional. [place] [address] any warranty or liability for your use of this information. Laryngitis: Care Instructions  Your Care Instructions    Laryngitis is an inflammation of the voice box (larynx) that causes your voice to become raspy or hoarse. It can be short-lived or long-lasting. Most of the time, laryngitis comes on quickly and lasts as long as 2 weeks. It is caused by overuse, irritation, or infection of the vocal cords inside the larynx.   Some of the most common causes are a cold, the flu, or allergies. Loud talking, shouting, cheering, or singing also can cause laryngitis. Stomach acid that backs up into the throat also can make you lose your voice. Resting your voice and taking other steps at home can help you get your voice back. Follow-up care is a key part of your treatment and safety. Be sure to make and go to all appointments, and call your doctor if you are having problems. It's also a good idea to know your test results and keep a list of the medicines you take. How can you care for yourself at home? · Follow your doctor's directions for treating the condition that caused you to lose your voice. If your doctor prescribed antibiotics, take them as directed. Do not stop taking them just because you feel better. You need to take the full course of antibiotics. · Before you use cough and cold medicines, check the label. They may not be safe for young children or for people with certain health problems. · Try to keep stomach acid from backing up into your throat. Do not eat just before bedtime. Reduce the amount of coffee and alcohol you drink, and eat healthy foods. Taking over-the-counter acid reducers can help when these steps are not enough. In some cases, you may need prescription medicine. · Rest your voice. You do not have to stop speaking, but use your voice as little as possible. Speak softly but do not whisper; whispering can bother your larynx more than speaking softly. Avoid talking on the telephone or trying to speak loudly. · Try not to clear your throat. This can cause more irritation of your larynx. Take an over-the-counter cough suppressant (if your doctor recommends it) if you have a dry cough that does not produce mucus. · Do not smoke or allow others to smoke around you. If you need help quitting, talk to your doctor about stop-smoking programs and medicines. These can increase your chances of quitting for good.   · Use a humidifier or vaporizer to add moisture to your bedroom. Humidity helps to thin the mucus in the nasal membranes that causes stuffiness or postnasal drip. Follow the directions for cleaning the machine. · Drink plenty of fluids, enough so that your urine is light yellow or clear like water. If you have kidney, heart, or liver disease and have to limit fluids, talk with your doctor before you increase the amount of fluids you drink. · Use saline (saltwater) nasal washes to help keep your nasal passages open and wash out mucus and bacteria. You can buy saline nose drops at a grocery store or drugstore. Or, you can make your own at home by mixing ½ teaspoon salt, 1 cup water (at room temperature), and ½ teaspoon baking soda. If you make your own, fill a bulb syringe with the solution, insert the tip into your nostril, and squeeze gently. Patricia Whittd your nose. When should you call for help? Call 911 anytime you think you may need emergency care. For example, call if:    · You have trouble breathing.    Call your doctor now or seek immediate medical care if:    · You have new or worse pain.     · You have trouble swallowing.    Watch closely for changes in your health, and be sure to contact your doctor if:    · You do not get better as expected. Where can you learn more? Go to http://harshil-elmer.info/. Enter U599 in the search box to learn more about \"Laryngitis: Care Instructions. \"  Current as of: October 21, 2018  Content Version: 12.1  © 8222-0570 Healthwise, Incorporated. Care instructions adapted under license by [employer] (which disclaims liability or warranty for this information). If you have questions about a medical condition or this instruction, always ask your healthcare professional. [address] any warranty or liability for your use of this information.

## 2019-08-26 RX ORDER — TADALAFIL 20 MG/1
TABLET ORAL
Qty: 8 TAB | Refills: 5 | Status: SHIPPED | OUTPATIENT
Start: 2019-08-26 | End: 2021-09-13

## 2019-08-26 NOTE — TELEPHONE ENCOUNTER
Requested Prescriptions     Pending Prescriptions Disp Refills    tadalafil (CIALIS) 20 mg tablet 8 Tab 5     Sig: One tablet every 3 days as needed

## 2019-08-28 ENCOUNTER — TELEPHONE (OUTPATIENT)
Dept: INTERNAL MEDICINE CLINIC | Age: 44
End: 2019-08-28

## 2019-08-28 NOTE — TELEPHONE ENCOUNTER
Prior auth request received for patient medication Cialis. Prior auth completed via cover my meds and sent to [facility] [employer]. Will await decision.

## 2021-09-13 ENCOUNTER — OFFICE VISIT (OUTPATIENT)
Dept: INTERNAL MEDICINE CLINIC | Age: 46
End: 2021-09-13
Payer: MEDICAID

## 2021-09-13 VITALS
WEIGHT: 179 LBS | BODY MASS INDEX: 20.71 KG/M2 | DIASTOLIC BLOOD PRESSURE: 95 MMHG | HEART RATE: 84 BPM | OXYGEN SATURATION: 98 % | TEMPERATURE: 96.9 F | RESPIRATION RATE: 18 BRPM | HEIGHT: 78 IN | SYSTOLIC BLOOD PRESSURE: 158 MMHG

## 2021-09-13 DIAGNOSIS — K04.7 TOOTH ABSCESS: ICD-10-CM

## 2021-09-13 DIAGNOSIS — Z13.220 SCREENING FOR LIPID DISORDERS: ICD-10-CM

## 2021-09-13 DIAGNOSIS — I10 HYPERTENSION, UNSPECIFIED TYPE: ICD-10-CM

## 2021-09-13 DIAGNOSIS — Z12.12 ENCOUNTER FOR COLORECTAL CANCER SCREENING: ICD-10-CM

## 2021-09-13 DIAGNOSIS — F41.9 ANXIETY: Primary | ICD-10-CM

## 2021-09-13 DIAGNOSIS — Z12.11 ENCOUNTER FOR COLORECTAL CANCER SCREENING: ICD-10-CM

## 2021-09-13 DIAGNOSIS — R74.8 ELEVATED LIVER ENZYMES: ICD-10-CM

## 2021-09-13 PROCEDURE — 99214 OFFICE O/P EST MOD 30 MIN: CPT | Performed by: NURSE PRACTITIONER

## 2021-09-13 RX ORDER — CLONAZEPAM 1 MG/1
.5-1 TABLET ORAL
Qty: 24 TABLET | Refills: 0 | Status: SHIPPED | OUTPATIENT
Start: 2021-09-13 | End: 2021-11-01

## 2021-09-13 RX ORDER — AMOXICILLIN 500 MG/1
500 CAPSULE ORAL 3 TIMES DAILY
Qty: 21 CAPSULE | Refills: 0 | Status: SHIPPED | OUTPATIENT
Start: 2021-09-13 | End: 2021-09-20

## 2021-09-13 RX ORDER — HYDROCHLOROTHIAZIDE 12.5 MG/1
12.5 TABLET ORAL DAILY
Qty: 30 TABLET | Refills: 1 | Status: SHIPPED | OUTPATIENT
Start: 2021-09-13 | End: 2021-11-01 | Stop reason: SDUPTHER

## 2021-09-13 NOTE — PROGRESS NOTES
HISTORY OF PRESENT ILLNESS  Kirby Gonzales is a 48 y.o. male. Here for anxiety and concern of blood pressure. HPI    1) Anxiety - this is a chronic problem which has exacerbated within the last 4-5 weeks - previously taking klonopin 0.5- 1 mg BID as needed - feels he is extra stressed with his business and home   He request refill today due to increased stress. 2) Elevated blood pressure - home readings from 09/01-09/11 154/104, 153/107, 136/90, 127/93 & 140/99 - Dips 1 can a day. Does reports eating fried foods. Walks every other day for an hour. BP Readings from Last 3 Encounters:   09/13/21 (!) 158/95   07/30/19 (!) 142/95   03/25/19 124/84     3) Abscess on tooth - will be obtaining appointment with dentist - requests antibiotic he has one last year and for the last 4-5 months he has been dealing with this. 4) Acid reflux - OTC acid reducer     BP (!) 158/95 (BP 1 Location: Left upper arm, BP Patient Position: Sitting)   Pulse 84   Temp 96.9 °F (36.1 °C) (Temporal)   Resp 18   Ht 6' 7\" (2.007 m)   Wt 179 lb (81.2 kg)   SpO2 98%   BMI 20.17 kg/m²   Current Outpatient Medications   Medication Sig Dispense Refill    ubidecarenone (COQ-10 PO) Take  by mouth.  cimetidine (ACID REDUCER 200 PO) Take  by mouth.  clonazePAM (KlonoPIN) 1 mg tablet Take 0.5-1 Tablets by mouth two (2) times daily as needed (anxiety). Max Daily Amount: 2 mg. 24 Tablet 0    amoxicillin (AMOXIL) 500 mg capsule Take 1 Capsule by mouth three (3) times daily for 7 days. 21 Capsule 0    hydroCHLOROthiazide (HYDRODIURIL) 12.5 mg tablet Take 1 Tablet by mouth daily. 30 Tablet 1       Review of Systems   Constitutional: Negative for chills, fever and malaise/fatigue. HENT:        Gum pressure    Eyes: Negative for blurred vision and double vision. Respiratory: Negative for cough, shortness of breath and wheezing. Cardiovascular: Negative for chest pain, palpitations and leg swelling.    Gastrointestinal: Positive for heartburn. Neurological: Negative for dizziness and headaches. Psychiatric/Behavioral: The patient is nervous/anxious. Physical Exam  Vitals and nursing note reviewed. Constitutional:       General: He is not in acute distress. Appearance: Normal appearance. He is not ill-appearing. HENT:      Head: Normocephalic. Right Ear: External ear normal.      Left Ear: External ear normal.      Mouth/Throat:      Comments: MASK  Eyes:      General: No scleral icterus. Extraocular Movements: Extraocular movements intact. Conjunctiva/sclera: Conjunctivae normal.      Pupils: Pupils are equal, round, and reactive to light. Cardiovascular:      Rate and Rhythm: Normal rate and regular rhythm. Pulses: Normal pulses. Heart sounds: Normal heart sounds. Pulmonary:      Effort: Pulmonary effort is normal. No respiratory distress. Breath sounds: Normal breath sounds. No wheezing. Musculoskeletal:         General: Normal range of motion. Cervical back: Normal range of motion. Right lower leg: No edema. Left lower leg: No edema. Lymphadenopathy:      Cervical: No cervical adenopathy. Skin:     General: Skin is warm and dry. Findings: No lesion or rash. Neurological:      General: No focal deficit present. Mental Status: He is alert and oriented to person, place, and time. Mental status is at baseline. Psychiatric:         Mood and Affect: Mood normal.         Behavior: Behavior normal.         Thought Content: Thought content normal.         Judgment: Judgment normal.         ASSESSMENT and PLAN  Diagnoses and all orders for this visit:    1. Anxiety  -     clonazePAM (KlonoPIN) 1 mg tablet; Take 0.5-1 Tablets by mouth two (2) times daily as needed (anxiety). Max Daily Amount: 2 mg.  -     TSH AND FREE T4; Future    2. Hypertension, unspecified type  -     hydroCHLOROthiazide (HYDRODIURIL) 12.5 mg tablet;  Take 1 Tablet by mouth daily.    3. Tooth abscess  -     amoxicillin (AMOXIL) 500 mg capsule; Take 1 Capsule by mouth three (3) times daily for 7 days. -     CBC WITH AUTOMATED DIFF; Future    4. Elevated liver enzymes  -     METABOLIC PANEL, COMPREHENSIVE; Future    5. Screening for lipid disorders  -     LIPID PANEL; Future    6. Encounter for colorectal cancer screening  -     REFERRAL TO GASTROENTEROLOGY    - klonopin as needed - if needing more often may need daily anti anxiety medications   -  no abberancies   - Check BP BID and bring log to next visit   - BP not controlled added HCTZ and lifestyle changes   - Amoxicillin for tooth - make appointment with dentist  - update CMP and check lipids  - referral for colonoscopy     Follow-up and Dispositions    · Return in about 6 weeks (around 10/25/2021) for HTN.

## 2021-09-13 NOTE — PATIENT INSTRUCTIONS
High Blood Pressure: Care Instructions  Overview     It's normal for blood pressure to go up and down throughout the day. But if it stays up, you have high blood pressure. Another name for high blood pressure is hypertension. Despite what a lot of people think, high blood pressure usually doesn't cause headaches or make you feel dizzy or lightheaded. It usually has no symptoms. But it does increase your risk of stroke, heart attack, and other problems. You and your doctor will talk about your risks of these problems based on your blood pressure. Your doctor will give you a goal for your blood pressure. Your goal will be based on your health and your age. Lifestyle changes, such as eating healthy and being active, are always important to help lower blood pressure. You might also take medicine to reach your blood pressure goal.  Follow-up care is a key part of your treatment and safety. Be sure to make and go to all appointments, and call your doctor if you are having problems. It's also a good idea to know your test results and keep a list of the medicines you take. How can you care for yourself at home? Medical treatment  · If you stop taking your medicine, your blood pressure will go back up. You may take one or more types of medicine to lower your blood pressure. Be safe with medicines. Take your medicine exactly as prescribed. Call your doctor if you think you are having a problem with your medicine. · Talk to your doctor before you start taking aspirin every day. Aspirin can help certain people lower their risk of a heart attack or stroke. But taking aspirin isn't right for everyone, because it can cause serious bleeding. · See your doctor regularly. You may need to see the doctor more often at first or until your blood pressure comes down. · If you are taking blood pressure medicine, talk to your doctor before you take decongestants or anti-inflammatory medicine, such as ibuprofen.  Some of these medicines can raise blood pressure. · Learn how to check your blood pressure at home. Lifestyle changes  · Stay at a healthy weight. This is especially important if you put on weight around the waist. Losing even 10 pounds can help you lower your blood pressure. · If your doctor recommends it, get more exercise. Walking is a good choice. Bit by bit, increase the amount you walk every day. Try for at least 30 minutes on most days of the week. You also may want to swim, bike, or do other activities. · Avoid or limit alcohol. Talk to your doctor about whether you can drink any alcohol. · Try to limit how much sodium you eat to less than 2,300 milligrams (mg) a day. Your doctor may ask you to try to eat less than 1,500 mg a day. · Eat plenty of fruits (such as bananas and oranges), vegetables, legumes, whole grains, and low-fat dairy products. · Lower the amount of saturated fat in your diet. Saturated fat is found in animal products such as milk, cheese, and meat. Limiting these foods may help you lose weight and also lower your risk for heart disease. · Do not smoke. Smoking increases your risk for heart attack and stroke. If you need help quitting, talk to your doctor about stop-smoking programs and medicines. These can increase your chances of quitting for good. When should you call for help? Call  911 anytime you think you may need emergency care. This may mean having symptoms that suggest that your blood pressure is causing a serious heart or blood vessel problem. Your blood pressure may be over 180/120. For example, call 911 if:    · You have symptoms of a heart attack. These may include:  ? Chest pain or pressure, or a strange feeling in the chest.  ? Sweating. ? Shortness of breath. ? Nausea or vomiting. ? Pain, pressure, or a strange feeling in the back, neck, jaw, or upper belly or in one or both shoulders or arms. ? Lightheadedness or sudden weakness.   ? A fast or irregular heartbeat.     · You have symptoms of a stroke. These may include:  ? Sudden numbness, tingling, weakness, or loss of movement in your face, arm, or leg, especially on only one side of your body. ? Sudden vision changes. ? Sudden trouble speaking. ? Sudden confusion or trouble understanding simple statements. ? Sudden problems with walking or balance. ? A sudden, severe headache that is different from past headaches.     · You have severe back or belly pain. Do not wait until your blood pressure comes down on its own. Get help right away. Call your doctor now or seek immediate care if:    · Your blood pressure is much higher than normal (such as 180/120 or higher), but you don't have symptoms.     · You think high blood pressure is causing symptoms, such as:  ? Severe headache.  ? Blurry vision. Watch closely for changes in your health, and be sure to contact your doctor if:    · Your blood pressure measures higher than your doctor recommends at least 2 times. That means the top number is higher or the bottom number is higher, or both.     · You think you may be having side effects from your blood pressure medicine. Where can you learn more? Go to http://www.gray.com/  Enter [id number] in the search box to learn more about \"High Blood Pressure: Care Instructions. \"  Current as of: August 31, 2020               Content Version: 12.8  © 2006-2021 [employer]. Care instructions adapted under license by [employer] (which disclaims liability or warranty for this information). If you have questions about a medical condition or this instruction, always ask your healthcare professional. [place] [address] any warranty or liability for your use of this information.

## 2021-09-13 NOTE — PROGRESS NOTES
ROOM # 1  Identified pt with two pt identifiers(name and ). Reviewed record in preparation for visit and have obtained necessary documentation. Chief Complaint   Patient presents with   [address] preferred language for health care discussion is english/other. Is the patient using any DME equipment during OV? NO    Ivette Cabral is due for:  Health Maintenance Due   Topic    Hepatitis C Screening     COVID-19 Vaccine (1)    DTaP/Tdap/Td series (1 - Tdap)    Lipid Screen     Colorectal Cancer Screening Combo     Flu Vaccine (1)     Health Maintenance reviewed and discussed per provider  Please order/place referral if appropriate. Advance Directive:  1. Do you have an advance directive in place? Patient Reply: NO    2. If not, would you like material regarding how to put one in place? NO    Coordination of Care:  1. Have you been to the ER, urgent care clinic since your last visit? Hospitalized since your last visit? NO    2. Have you seen or consulted any other health care providers outside of the [address] [place] [address] since your last visit? Include any pap smears or colon screening. NO    Patient is accompanied by self I have received verbal consent from Ivette Cabral to discuss any/all medical information while they are present in the room.     Learning Assessment:  Learning Assessment 10/29/2018   PRIMARY LEARNER Patient   HIGHEST LEVEL OF EDUCATION - PRIMARY LEARNER  > 4 YEARS OF COLLEGE   BARRIERS PRIMARY LEARNER NONE   CO-LEARNER CAREGIVER No   PRIMARY LANGUAGE ENGLISH   LEARNER PREFERENCE PRIMARY VIDEOS     PICTURES   ANSWERED BY patient   RELATIONSHIP SELF     Depression Screening:  3 most recent [facility] Screens 2021 3/11/2019 2019 2019 2018 10/29/2018   Little interest or pleasure in doing things Not at all Not at all Not at all Not at all Not at all Not at all   Feeling down, depressed, irritable, or hopeless Not at all Several days Not at all Not at all Not at all Several days   Total Score PHQ 2 0 1 0 0 0 1     Recent Travel Screening and Travel History documentation     Travel Screening     Question   Response    In the last month, have you been in contact with someone who was confirmed or suspected to have Coronavirus / COVID-19? No / Unsure    Have you had a COVID-19 viral test in the last 14 days? No    Do you have any of the following new or worsening symptoms? Have you traveled internationally or domestically in the last month?   No      Travel History   Travel since 08/13/21     No documented travel since 08/13/21

## 2021-09-14 LAB
A-G RATIO,AGRAT: 1.5 RATIO (ref 1.1–2.6)
ABSOLUTE LYMPHOCYTE COUNT, 10803: 1.8 K/UL (ref 1–4.8)
ALBUMIN SERPL-MCNC: 4.5 G/DL (ref 3.5–5)
ALP SERPL-CCNC: 68 U/L (ref 25–115)
ALT SERPL-CCNC: 7 U/L (ref 5–40)
ANION GAP SERPL CALC-SCNC: 13 MMOL/L (ref 3–15)
AST SERPL W P-5'-P-CCNC: 16 U/L (ref 10–37)
BASOPHILS # BLD: 0 K/UL (ref 0–0.2)
BASOPHILS NFR BLD: 1 % (ref 0–2)
BILIRUB SERPL-MCNC: 0.5 MG/DL (ref 0.2–1.2)
BUN SERPL-MCNC: 12 MG/DL (ref 6–22)
CALCIUM SERPL-MCNC: 9.4 MG/DL (ref 8.4–10.5)
CHLORIDE SERPL-SCNC: 100 MMOL/L (ref 98–110)
CHOLEST SERPL-MCNC: 167 MG/DL (ref 110–200)
CO2 SERPL-SCNC: 26 MMOL/L (ref 20–32)
CREAT SERPL-MCNC: 1.3 MG/DL (ref 0.5–1.2)
EOSINOPHIL # BLD: 0 K/UL (ref 0–0.5)
EOSINOPHIL NFR BLD: 1 % (ref 0–6)
ERYTHROCYTE [DISTWIDTH] IN BLOOD BY AUTOMATED COUNT: 12.7 % (ref 10–15.5)
GFRAA, 66117: >60
GFRNA, 66118: 56.9
GLOBULIN,GLOB: 3 G/DL (ref 2–4)
GLUCOSE SERPL-MCNC: 90 MG/DL (ref 70–99)
GRANULOCYTES,GRANS: 47 % (ref 40–75)
HCT VFR BLD AUTO: 41.3 % (ref 39.3–51.6)
HDLC SERPL-MCNC: 3.2 MG/DL (ref 0–5)
HDLC SERPL-MCNC: 52 MG/DL
HGB BLD-MCNC: 13 G/DL (ref 13.1–17.2)
LDL/HDL RATIO,LDHD: 2.1
LDLC SERPL CALC-MCNC: 107 MG/DL (ref 50–99)
LYMPHOCYTES, LYMLT: 47 % (ref 20–45)
MCH RBC QN AUTO: 30 PG (ref 26–34)
MCHC RBC AUTO-ENTMCNC: 32 G/DL (ref 31–36)
MCV RBC AUTO: 96 FL (ref 80–95)
MONOCYTES # BLD: 0.2 K/UL (ref 0.1–1)
MONOCYTES NFR BLD: 5 % (ref 3–12)
NEUTROPHILS # BLD AUTO: 1.8 K/UL (ref 1.8–7.7)
NON-HDL CHOLESTEROL, 011976: 116 MG/DL
PLATELET # BLD AUTO: 137 K/UL (ref 140–440)
PMV BLD AUTO: 12.5 FL (ref 9–13)
POTASSIUM SERPL-SCNC: 3.8 MMOL/L (ref 3.5–5.5)
PROT SERPL-MCNC: 7.5 G/DL (ref 6.4–8.3)
RBC # BLD AUTO: 4.3 M/UL (ref 3.8–5.8)
SODIUM SERPL-SCNC: 139 MMOL/L (ref 133–145)
T4 FREE SERPL-MCNC: 1.1 NG/DL (ref 0.9–1.8)
TRIGL SERPL-MCNC: 45 MG/DL (ref 40–149)
TSH SERPL DL<=0.005 MIU/L-ACNC: 1.71 MCU/ML (ref 0.27–4.2)
VLDLC SERPL CALC-MCNC: 9 MG/DL (ref 8–30)
WBC # BLD AUTO: 3.8 K/UL (ref 4–11)

## 2021-09-15 DIAGNOSIS — R79.89 ABNORMAL BLOOD CREATININE LEVEL: ICD-10-CM

## 2021-09-15 DIAGNOSIS — R79.89 ABNORMAL CBC: Primary | ICD-10-CM

## 2021-09-15 NOTE — PROGRESS NOTES
Results reviewed. Please call patient with results.    CBC and CMP are slightly off  I would like to repeat the CBC and CMP in one month  - please advise patient of lab hours - recommend he stay hydrated and not drink any alcohol  Cholesterol is slightly elevated recommend lifestyle changes

## 2021-09-16 NOTE — PROGRESS NOTES
Spoke with pt in regards to lab results, two pt identifier's. Relayed the Provider's note. Pt acknowledges understanding and voices no concerns at this time.

## 2021-11-01 ENCOUNTER — OFFICE VISIT (OUTPATIENT)
Dept: INTERNAL MEDICINE CLINIC | Age: 46
End: 2021-11-01
Payer: MEDICAID

## 2021-11-01 VITALS
HEART RATE: 91 BPM | HEIGHT: 78 IN | DIASTOLIC BLOOD PRESSURE: 89 MMHG | BODY MASS INDEX: 20.48 KG/M2 | OXYGEN SATURATION: 100 % | SYSTOLIC BLOOD PRESSURE: 143 MMHG | RESPIRATION RATE: 18 BRPM | TEMPERATURE: 97 F | WEIGHT: 177 LBS

## 2021-11-01 DIAGNOSIS — I10 HYPERTENSION, UNSPECIFIED TYPE: ICD-10-CM

## 2021-11-01 PROCEDURE — 99213 OFFICE O/P EST LOW 20 MIN: CPT | Performed by: NURSE PRACTITIONER

## 2021-11-01 RX ORDER — AMLODIPINE BESYLATE 5 MG/1
5 TABLET ORAL DAILY
Qty: 30 TABLET | Refills: 1 | Status: SHIPPED | OUTPATIENT
Start: 2021-11-01 | End: 2021-11-15 | Stop reason: ALTCHOICE

## 2021-11-01 RX ORDER — HYDROCHLOROTHIAZIDE 12.5 MG/1
12.5 TABLET ORAL DAILY
Qty: 90 TABLET | Refills: 1 | Status: SHIPPED | OUTPATIENT
Start: 2021-11-01 | End: 2022-03-28 | Stop reason: ALTCHOICE

## 2021-11-01 NOTE — PROGRESS NOTES
ROOM # 1  Identified pt with two pt identifiers(name and ). Reviewed record in preparation for visit and have obtained necessary documentation. Chief Complaint   Patient presents with    Hypertension      Ashanti Tiwari preferred language for health care discussion is english/other. Is the patient using any DME equipment during OV? NO    Ashanti Brucmichelle is due for:  Health Maintenance Due   Topic    Hepatitis C Screening     DTaP/Tdap/Td series (1 - Tdap)    Colorectal Cancer Screening Combo      Health Maintenance reviewed and discussed per provider  Please order/place referral if appropriate. Advance Directive:  1. Do you have an advance directive in place? Patient Reply: NO    2. If not, would you like material regarding how to put one in place? NO    Coordination of Care:  1. Have you been to the ER, urgent care clinic since your last visit? Hospitalized since your last visit? NO    2. Have you seen or consulted any other health care providers outside of the [address] [place] [address] since your last visit? Include any pap smears or colon screening. NO    Patient is accompanied by self I have received verbal consent from Ashanti Tiwari to discuss any/all medical information while they are present in the room.     Learning Assessment:  Learning Assessment 10/29/2018   PRIMARY LEARNER Patient   HIGHEST LEVEL OF EDUCATION - PRIMARY LEARNER  > 4 YEARS OF COLLEGE   BARRIERS PRIMARY LEARNER NONE   CO-LEARNER CAREGIVER No   PRIMARY LANGUAGE ENGLISH   LEARNER PREFERENCE PRIMARY VIDEOS     PICTURES   ANSWERED BY patient   RELATIONSHIP SELF     Depression Screening:  3 most recent [facility] Screens 2021 2021 3/11/2019 2019 2019 2018 10/29/2018   Little interest or pleasure in doing things Not at all Not at all Not at all Not at all Not at all Not at all Not at all   Feeling down, depressed, irritable, or hopeless Not at all Not at all Several days Not at all Not at all Not at all Several days Total Score PHQ 2 0 0 1 0 0 0 1     Recent Travel Screening and Travel History documentation     Travel Screening      No screening recorded since 10/31/21 0000      Travel History   Travel since 10/01/21     No documented travel since 10/01/21          .

## 2021-11-01 NOTE — PROGRESS NOTES
HISTORY OF PRESENT ILLNESS  Balbir Ramon is a 48 y.o. male. Here for HTN. HPI  1) HTN- HCTZ 12.5 mg daily - has changed his diet, has cut down on dip and lessoned his stress. Has a root canal in 9 days. BP Readings from Last 3 Encounters:   11/01/21 (!) 143/89   09/13/21 (!) 158/95   07/30/19 (!) 142/95     BP (!) 143/89 (BP 1 Location: Left upper arm, BP Patient Position: Sitting)   Pulse 91   Temp 97 °F (36.1 °C) (Temporal)   Resp 18   Ht 6' 7\" (2.007 m)   Wt 177 lb (80.3 kg)   SpO2 100%   BMI 19.94 kg/m²   Current Outpatient Medications   Medication Sig Dispense Refill    hydroCHLOROthiazide (HYDRODIURIL) 12.5 mg tablet Take 1 Tablet by mouth daily. 90 Tablet 1    amLODIPine (NORVASC) 5 mg tablet Take 1 Tablet by mouth daily. 30 Tablet 1    cimetidine (ACID REDUCER 200 PO) Take  by mouth. Review of Systems   Constitutional: Negative for chills, fever and malaise/fatigue. Eyes: Negative for blurred vision and double vision. Respiratory: Negative for cough, shortness of breath and wheezing. Cardiovascular: Negative for chest pain, palpitations and leg swelling. Neurological: Negative for dizziness and headaches. Physical Exam  Vitals and nursing note reviewed. Constitutional:       General: He is not in acute distress. Appearance: Normal appearance. He is not ill-appearing. HENT:      Head: Normocephalic. Right Ear: External ear normal.      Left Ear: External ear normal.      Mouth/Throat:      Comments: MASK  Eyes:      General: No scleral icterus. Extraocular Movements: Extraocular movements intact. Conjunctiva/sclera: Conjunctivae normal.      Pupils: Pupils are equal, round, and reactive to light. Cardiovascular:      Rate and Rhythm: Normal rate and regular rhythm. Pulses: Normal pulses. Heart sounds: Normal heart sounds. Pulmonary:      Effort: Pulmonary effort is normal. No respiratory distress. Breath sounds: Normal breath sounds. No wheezing. Musculoskeletal:         General: Normal range of motion. Cervical back: Normal range of motion. Right lower leg: No edema. Left lower leg: No edema. Lymphadenopathy:      Cervical: No cervical adenopathy. Skin:     General: Skin is warm and dry. Findings: No lesion or rash. Neurological:      General: No focal deficit present. Mental Status: He is alert and oriented to person, place, and time. Mental status is at baseline. Psychiatric:         Mood and Affect: Mood normal.         Behavior: Behavior normal.         Thought Content: Thought content normal.         Judgment: Judgment normal.         ASSESSMENT and PLAN  Diagnoses and all orders for this visit:    1. Hypertension, unspecified type  -     hydroCHLOROthiazide (HYDRODIURIL) 12.5 mg tablet; Take 1 Tablet by mouth daily. -     amLODIPine (NORVASC) 5 mg tablet; Take 1 Tablet by mouth daily. BP not controlled - adding amlodipine     Follow-up and Dispositions    · Return in about 2 months (around 1/1/2022) for HTN.

## 2021-11-02 LAB
ALBUMIN SERPL-MCNC: 4.8 G/DL (ref 4–5)
ALBUMIN/GLOB SERPL: 1.8 {RATIO} (ref 1.2–2.2)
ALP SERPL-CCNC: 69 IU/L (ref 44–121)
ALT SERPL-CCNC: 9 IU/L (ref 0–44)
AST SERPL-CCNC: 14 IU/L (ref 0–40)
BILIRUB SERPL-MCNC: 0.5 MG/DL (ref 0–1.2)
BUN SERPL-MCNC: 9 MG/DL (ref 6–24)
BUN/CREAT SERPL: 6 (ref 9–20)
CALCIUM SERPL-MCNC: 9.6 MG/DL (ref 8.7–10.2)
CHLORIDE SERPL-SCNC: 99 MMOL/L (ref 96–106)
CO2 SERPL-SCNC: 27 MMOL/L (ref 20–29)
CREAT SERPL-MCNC: 1.41 MG/DL (ref 0.76–1.27)
ERYTHROCYTE [DISTWIDTH] IN BLOOD BY AUTOMATED COUNT: 12.1 % (ref 11.6–15.4)
GLOBULIN SER CALC-MCNC: 2.7 G/DL (ref 1.5–4.5)
GLUCOSE SERPL-MCNC: 90 MG/DL (ref 65–99)
HCT VFR BLD AUTO: 38.5 % (ref 37.5–51)
HGB BLD-MCNC: 12.6 G/DL (ref 13–17.7)
MCH RBC QN AUTO: 30.2 PG (ref 26.6–33)
MCHC RBC AUTO-ENTMCNC: 32.7 G/DL (ref 31.5–35.7)
MCV RBC AUTO: 92 FL (ref 79–97)
PLATELET # BLD AUTO: 143 X10E3/UL (ref 150–450)
POTASSIUM SERPL-SCNC: 4.1 MMOL/L (ref 3.5–5.2)
PROT SERPL-MCNC: 7.5 G/DL (ref 6–8.5)
RBC # BLD AUTO: 4.17 X10E6/UL (ref 4.14–5.8)
SODIUM SERPL-SCNC: 141 MMOL/L (ref 134–144)
WBC # BLD AUTO: 3.6 X10E3/UL (ref 3.4–10.8)

## 2021-11-02 NOTE — LETTER
11/2/2021 1:15 PM 
 
Mr. Lakshmi Erazo [address] [facility] [place] [address] [id number] To whom it may concern: 
 
Mr Emmett Alexandre is under our medical care and is seen fit for having dental procedure performed. He has been started on an additional antihypertensive which will help with his blood pressure. If you have any additional questions feel free to call the office. Thanks.  
 
 
 
Sincerely, 
 
 
Damir Potter NP

## 2021-11-02 NOTE — TELEPHONE ENCOUNTER
Patient is requesting new medication Omeprazole 20 mg also he needs medical clearance letter sent to [facility] so that he can get his dental work completed

## 2021-11-02 NOTE — TELEPHONE ENCOUNTER
Pt needs to provide dental fax info for clearance letter. Letter in chart. In addition he needs to be checking his BP to ensure its <140/90 since starting the second HTN medication. Pt currently taking H2 blocker and GERD was not discussed at last visit.

## 2021-11-03 ENCOUNTER — TELEPHONE (OUTPATIENT)
Dept: INTERNAL MEDICINE CLINIC | Age: 46
End: 2021-11-03

## 2021-11-04 ENCOUNTER — TELEPHONE (OUTPATIENT)
Dept: INTERNAL MEDICINE CLINIC | Age: 46
End: 2021-11-04

## 2021-11-04 NOTE — TELEPHONE ENCOUNTER
Please return call to patient to go over his lab results please return call to patient when available

## 2021-11-08 ENCOUNTER — TELEPHONE (OUTPATIENT)
Dept: INTERNAL MEDICINE CLINIC | Age: 46
End: 2021-11-08

## 2021-11-08 DIAGNOSIS — Z76.0 MEDICATION REFILL: Primary | ICD-10-CM

## 2021-11-08 RX ORDER — OMEPRAZOLE 20 MG/1
20 CAPSULE, DELAYED RELEASE ORAL
Qty: 90 CAPSULE | Refills: 0 | Status: SHIPPED | OUTPATIENT
Start: 2021-11-08

## 2021-11-08 NOTE — TELEPHONE ENCOUNTER
Patients BP was 128/89 - he has went over his labs online.  He requested a medication for prescription for his acid reflux -  omeprazole 20 mg

## 2021-11-09 NOTE — TELEPHONE ENCOUNTER
S/w the pt re: lab results. Pt states being made aware by staff yesterday. Pt states with taking amlodipine and HCTZ, yesterday's BP was 128/89. Pt states after thinking about what he was told by the staff, pt voices concerns about Creatinine  he would like to discontinue HCTZ and start the amlodipine 10mg. Will notify.

## 2021-11-09 NOTE — TELEPHONE ENCOUNTER
S/w the pt regardin to new dosing. Pt advised to closely monitor BP readings , take two amlodipine 5mg, report BP reading and discuss refills in 10 days. Pt verbally acknowledges understanding and voices no further concerns at this time.

## 2021-11-15 ENCOUNTER — TELEPHONE (OUTPATIENT)
Dept: INTERNAL MEDICINE CLINIC | Age: 46
End: 2021-11-15

## 2021-11-15 DIAGNOSIS — I10 HYPERTENSION, UNSPECIFIED TYPE: Primary | ICD-10-CM

## 2021-11-15 RX ORDER — AMLODIPINE BESYLATE 10 MG/1
10 TABLET ORAL DAILY
Qty: 90 TABLET | Refills: 0 | Status: SHIPPED | OUTPATIENT
Start: 2021-11-15 | End: 2022-02-10

## 2022-02-10 DIAGNOSIS — I10 HYPERTENSION, UNSPECIFIED TYPE: ICD-10-CM

## 2022-02-10 RX ORDER — AMLODIPINE BESYLATE 10 MG/1
TABLET ORAL
Qty: 30 TABLET | Refills: 0 | Status: SHIPPED | OUTPATIENT
Start: 2022-02-10 | End: 2022-03-28 | Stop reason: SDUPTHER

## 2022-03-28 ENCOUNTER — VIRTUAL VISIT (OUTPATIENT)
Dept: INTERNAL MEDICINE CLINIC | Age: 47
End: 2022-03-28
Payer: MEDICAID

## 2022-03-28 DIAGNOSIS — R79.89 ELEVATED SERUM CREATININE: ICD-10-CM

## 2022-03-28 DIAGNOSIS — I10 HYPERTENSION, UNSPECIFIED TYPE: Primary | ICD-10-CM

## 2022-03-28 PROCEDURE — 99213 OFFICE O/P EST LOW 20 MIN: CPT | Performed by: NURSE PRACTITIONER

## 2022-03-28 RX ORDER — AMLODIPINE BESYLATE 10 MG/1
10 TABLET ORAL DAILY
Qty: 30 TABLET | Refills: 2 | Status: SHIPPED | OUTPATIENT
Start: 2022-03-28 | End: 2022-07-05

## 2022-03-28 NOTE — PROGRESS NOTES
Gale Estes (: 1975) is a 50 y.o. male, established patient, here for evaluation of the following chief complaint(s):   Hypertension     ASSESSMENT/PLAN:  Below is the assessment and plan developed based on review of pertinent history, labs, studies, and medications. 1. Hypertension, unspecified type  -     amLODIPine (NORVASC) 10 mg tablet; Take 1 Tablet by mouth daily. , Normal, Disp-30 Tablet, R-2  2. Elevated serum creatinine  -     METABOLIC PANEL, COMPREHENSIVE; Future  HTN -controlled - amlodipine   update lipids at next visit     Return in about 6 months (around 2022) for HTN. SUBJECTIVE/OBJECTIVE:  HPI    1) HTN - this is chronic- amlodipine 10 mg daily - reports compliance. 137/79 prior to taking medications   Has two teeth extracted recently   BP Readings from Last 3 Encounters:   21 (!) 143/89   21 (!) 158/95   19 (!) 142/95     Lab Results   Component Value Date/Time    Sodium 141 2021 12:00 AM    Potassium 4.1 2021 12:00 AM    Chloride 99 2021 12:00 AM    CO2 27 2021 12:00 AM    Anion gap 13.0 2021 03:04 PM    Glucose 90 2021 12:00 AM    BUN 9 2021 12:00 AM    Creatinine 1.41 (H) 2021 12:00 AM    BUN/Creatinine ratio 6 (L) 2021 12:00 AM    GFR est AA 68 2021 12:00 AM    GFR est non-AA 58 (L) 2021 12:00 AM    Calcium 9.6 2021 12:00 AM    Bilirubin, total 0.5 2021 12:00 AM    Alk. phosphatase 69 2021 12:00 AM    Protein, total 7.5 2021 12:00 AM    Albumin 4.8 2021 12:00 AM    Globulin 3.0 2021 03:04 PM    A-G Ratio 1.8 2021 12:00 AM    ALT (SGPT) 9 2021 12:00 AM    AST (SGOT) 14 2021 12:00 AM     Current Outpatient Medications   Medication Sig Dispense Refill    amLODIPine (NORVASC) 10 mg tablet Take 1 Tablet by mouth daily. 30 Tablet 2    omeprazole (PRILOSEC) 20 mg capsule Take 1 Capsule by mouth daily as needed (acid reflux).  90 Capsule 0 Review of Systems   Constitutional: Negative for chills, fatigue and fever. Respiratory: Negative for chest tightness, shortness of breath and wheezing. Cardiovascular: Negative for chest pain and palpitations. No data recorded   Telephone visit only    Helen June was evaluated through a synchronous (real-time) audio-video encounter. The patient (or guardian if applicable) is aware that this is a billable service, which includes applicable co-pays. Verbal consent to proceed has been obtained. The visit was conducted pursuant to the emergency declaration under the [facility], [address] [place] [address] authority and the [employer] and [email or web address] General Act. Patient identification was verified, and a caregiver was present when appropriate. The patient was located at home in a state where the provider was licensed to provide care. An electronic signature was used to authenticate this note.   -- Jeri Lyon NP

## 2022-03-28 NOTE — PROGRESS NOTES
Virtual Visit    Identified pt with two pt identifiers(name and ). Reviewed record in preparation for visit and have obtained necessary documentation. Chief Complaint   Patient presents with    Hypertension      Zeke Him preferred language for health care discussion is english/other. Zeke Him is due for:  Health Maintenance Due   Topic    Hepatitis C Screening     DTaP/Tdap/Td series (1 - Tdap)    Colorectal Cancer Screening Combo     COVID-19 Vaccine (3 - Booster for [employer] series)     Health Maintenance reviewed and discussed per provider  Please order/place referral if appropriate. Advance Directive:  1. Do you have an advance directive in place? Patient Reply: NO    2. If not, would you like material regarding how to put one in place? NO    Coordination of Care:  1. Have you been to the ER, urgent care clinic since your last visit? Yes   Hospitalized since your last visit? NO    2. Have you seen or consulted any other health care providers outside of the [address] [place] [address] since your last visit? Include any pap smears or colon screening.  NO      Learning Assessment:  Learning Assessment 10/29/2018   PRIMARY LEARNER Patient   HIGHEST LEVEL OF EDUCATION - PRIMARY LEARNER  > 4 YEARS OF COLLEGE   BARRIERS PRIMARY LEARNER NONE   CO-LEARNER CAREGIVER No   PRIMARY LANGUAGE ENGLISH   LEARNER PREFERENCE PRIMARY VIDEOS     PICTURES   ANSWERED BY patient   RELATIONSHIP SELF     Depression Screening:  3 most recent [facility] Screens 2021 2021 3/11/2019 2019 2019 2018 10/29/2018   Little interest or pleasure in doing things Not at all Not at all Not at all Not at all Not at all Not at all Not at all   Feeling down, depressed, irritable, or hopeless Not at all Not at all Several days Not at all Not at all Not at all Several days   Total Score PHQ 2 0 0 1 0 0 0 1       Recent Travel Screening and Travel History documentation     Travel Screening     No screening recorded since 03/27/22 0000     Travel History   Travel since 02/28/22    No documented travel since 02/28/22

## 2022-03-29 LAB
ALBUMIN SERPL-MCNC: 5 G/DL (ref 4–5)
ALBUMIN/GLOB SERPL: 1.9 {RATIO} (ref 1.2–2.2)
ALP SERPL-CCNC: 61 IU/L (ref 44–121)
ALT SERPL-CCNC: 7 IU/L (ref 0–44)
AST SERPL-CCNC: 13 IU/L (ref 0–40)
BILIRUB SERPL-MCNC: 0.5 MG/DL (ref 0–1.2)
BUN SERPL-MCNC: 11 MG/DL (ref 6–24)
BUN/CREAT SERPL: 9 (ref 9–20)
CALCIUM SERPL-MCNC: 9.8 MG/DL (ref 8.7–10.2)
CHLORIDE SERPL-SCNC: 103 MMOL/L (ref 96–106)
CO2 SERPL-SCNC: 24 MMOL/L (ref 20–29)
CREAT SERPL-MCNC: 1.18 MG/DL (ref 0.76–1.27)
EGFR: 76 ML/MIN/1.73
GLOBULIN SER CALC-MCNC: 2.7 G/DL (ref 1.5–4.5)
GLUCOSE SERPL-MCNC: 98 MG/DL (ref 65–99)
POTASSIUM SERPL-SCNC: 4 MMOL/L (ref 3.5–5.2)
PROT SERPL-MCNC: 7.7 G/DL (ref 6–8.5)
SODIUM SERPL-SCNC: 141 MMOL/L (ref 134–144)

## 2022-07-02 DIAGNOSIS — I10 HYPERTENSION, UNSPECIFIED TYPE: ICD-10-CM

## 2022-07-05 RX ORDER — AMLODIPINE BESYLATE 10 MG/1
TABLET ORAL
Qty: 30 TABLET | Refills: 2 | Status: SHIPPED | OUTPATIENT
Start: 2022-07-05 | End: 2022-09-19

## 2022-08-02 ENCOUNTER — TELEPHONE (OUTPATIENT)
Dept: INTERNAL MEDICINE CLINIC | Age: 47
End: 2022-08-02

## 2022-08-02 NOTE — TELEPHONE ENCOUNTER
Patient is calling in stating he was playing with his son and hurt his back again. Is asking if you can refill the muscle relaxer he was given last time this happened.

## 2022-08-04 RX ORDER — METHOCARBAMOL 500 MG/1
500 TABLET, FILM COATED ORAL
Qty: 30 TABLET | Refills: 0 | Status: SHIPPED | OUTPATIENT
Start: 2022-08-04 | End: 2022-08-14

## 2022-08-04 NOTE — TELEPHONE ENCOUNTER
Spoke with pt in regards to name of the muscle relaxer medication. Two patient identifier's verified. Relayed the PCP's note. Pt states he will call Ciera Barr to find out the name of the medication. Pt states he was previously prescribed the medication from overexerting himself while playing basketball with his son/friends. Pt c/o feeling sore and tight around the ribcage area and back of legs when walking. Pt states [facility] checked his potassium and urine and states the results were normal. Pt states he will call back with the name of this medication.

## 2022-09-16 DIAGNOSIS — I10 HYPERTENSION, UNSPECIFIED TYPE: ICD-10-CM

## 2022-09-19 RX ORDER — AMLODIPINE BESYLATE 10 MG/1
TABLET ORAL
Qty: 30 TABLET | Refills: 2 | Status: SHIPPED | OUTPATIENT
Start: 2022-09-19

## 2023-02-08 DIAGNOSIS — I10 HYPERTENSION, UNSPECIFIED TYPE: ICD-10-CM

## 2023-02-08 RX ORDER — AMLODIPINE BESYLATE 10 MG/1
10 TABLET ORAL DAILY
Qty: 30 TABLET | Refills: 0 | Status: SHIPPED | OUTPATIENT
Start: 2023-02-08 | End: 2023-02-08

## 2023-02-08 RX ORDER — AMLODIPINE BESYLATE 10 MG/1
TABLET ORAL
Qty: 30 TABLET | Refills: 0 | Status: SHIPPED | OUTPATIENT
Start: 2023-02-08

## 2023-04-03 NOTE — TELEPHONE ENCOUNTER
S/w the pt regarding to follow up appt. And med refill. Pt is agreeable and is scheduled for 6/14/23. Pt states he is currently out of his meds, requesting refill to [facility]. Pt states he address is the same and will update at a later time. Will notify. Doreen Bethea

## 2023-04-05 RX ORDER — AMLODIPINE BESYLATE 10 MG/1
TABLET ORAL
Qty: 90 TABLET | Refills: 0 | Status: SHIPPED | OUTPATIENT
Start: 2023-04-05

## 2023-04-05 RX ORDER — OMEPRAZOLE 20 MG/1
20 CAPSULE, DELAYED RELEASE ORAL DAILY
Qty: 90 CAPSULE | Refills: 0 | Status: SHIPPED | OUTPATIENT
Start: 2023-04-05

## 2023-07-20 ENCOUNTER — TELEMEDICINE (OUTPATIENT)
Facility: CLINIC | Age: 48
End: 2023-07-20
Payer: MEDICAID

## 2023-07-20 DIAGNOSIS — Z12.11 COLON CANCER SCREENING: ICD-10-CM

## 2023-07-20 DIAGNOSIS — N52.9 ERECTILE DYSFUNCTION, UNSPECIFIED ERECTILE DYSFUNCTION TYPE: ICD-10-CM

## 2023-07-20 DIAGNOSIS — I10 ESSENTIAL (PRIMARY) HYPERTENSION: Primary | ICD-10-CM

## 2023-07-20 DIAGNOSIS — M25.50 ARTHRALGIA, UNSPECIFIED JOINT: ICD-10-CM

## 2023-07-20 PROCEDURE — 99214 OFFICE O/P EST MOD 30 MIN: CPT | Performed by: PHYSICIAN ASSISTANT

## 2023-07-20 RX ORDER — AMLODIPINE BESYLATE 10 MG/1
10 TABLET ORAL DAILY
Qty: 90 TABLET | Refills: 0 | Status: SHIPPED | OUTPATIENT
Start: 2023-07-20

## 2023-07-20 RX ORDER — TADALAFIL 20 MG/1
20 TABLET ORAL DAILY PRN
Qty: 8 TABLET | Refills: 2 | Status: SHIPPED | OUTPATIENT
Start: 2023-07-20

## 2023-07-20 RX ORDER — MELOXICAM 15 MG/1
15 TABLET ORAL DAILY
Qty: 30 TABLET | Refills: 0 | Status: SHIPPED | OUTPATIENT
Start: 2023-07-20

## 2023-07-20 ASSESSMENT — PATIENT HEALTH QUESTIONNAIRE - PHQ9
SUM OF ALL RESPONSES TO PHQ9 QUESTIONS 1 & 2: 0
SUM OF ALL RESPONSES TO PHQ QUESTIONS 1-9: 0
2. FEELING DOWN, DEPRESSED OR HOPELESS: 0
1. LITTLE INTEREST OR PLEASURE IN DOING THINGS: 0

## 2023-07-20 ASSESSMENT — ENCOUNTER SYMPTOMS: SHORTNESS OF BREATH: 0

## 2023-07-20 NOTE — PROGRESS NOTES
Beto Woody (:  1975) is a Established patient, presenting virtually for evaluation of the following:    Assessment & Plan   Below is the assessment and plan developed based on review of pertinent history, physical exam, labs, studies, and medications. 1. Essential (primary) hypertension  -     amLODIPine (NORVASC) 10 MG tablet; Take 1 tablet by mouth daily, Disp-90 tablet, R-0Normal  - Advised due for lab. Plan to complete at upcoming visit. 2. Erectile dysfunction, unspecified erectile dysfunction type  -     tadalafil (CIALIS) 20 MG tablet; Take 1 tablet by mouth daily as needed for Erectile Dysfunction, Disp-8 tablet, R-2Normal  - Safety information regarding Cialis reviewed. Always disclose to emergency medical professionals. 3. Arthralgia, unspecified joint  -     meloxicam (MOBIC) 15 MG tablet; Take 1 tablet by mouth daily, Disp-30 tablet, R-0Normal  4. Colon cancer screening  - He has a provider and is needing to call to schedule. Asked for records to be sent to PCP. Upcoming appt with new PCP 23. Subjective   HPI    Presents for medication refills until he establishes with his new PCP 23. 1) HTN - checks periodic home readings 120's-low 140's/low 90's he believes. Taking amlodipine 10 mg daily. BP Readings from Last 3 Encounters:   21 (!) 143/89   21 (!) 158/95       2) ED - desires Cialis refill. 3) Desires refill of Meloxicam. Uses very occasionally for various joint pains and occasional TMJ pain. Review of Systems   Respiratory:  Negative for shortness of breath. Cardiovascular:  Negative for chest pain and leg swelling. Neurological:  Negative for dizziness and headaches.         Objective   Patient-Reported Vitals  No data recorded     Physical Exam  [INSTRUCTIONS:  \"[x]\" Indicates a positive item  \"[]\" Indicates a negative item  -- DELETE ALL ITEMS NOT EXAMINED]    Constitutional: [x] Appears well-developed and well-nourished [x] No

## 2023-07-20 NOTE — PROGRESS NOTES
1. \"Have you been to the ER, urgent care clinic since your last visit? Hospitalized since your last visit? \" No    2. \"Have you seen or consulted any other health care providers outside of the [address] [place] [address] since your last visit? \" No     3. For patients aged 43-73: Has the patient had a colonoscopy / FIT/ Cologuard? No      If the patient is female:    4. For patients aged 43-66: Has the patient had a mammogram within the past 2 years? NA - based on age or sex      11. For patients aged 21-65: Has the patient had a pap smear?  NA - based on age or sex

## 2023-08-16 DIAGNOSIS — M25.50 ARTHRALGIA, UNSPECIFIED JOINT: ICD-10-CM

## 2023-08-16 RX ORDER — MELOXICAM 15 MG/1
15 TABLET ORAL DAILY
Qty: 30 TABLET | Refills: 0 | Status: SHIPPED | OUTPATIENT
Start: 2023-08-16

## 2023-09-29 DIAGNOSIS — I10 ESSENTIAL (PRIMARY) HYPERTENSION: ICD-10-CM

## 2023-10-02 RX ORDER — AMLODIPINE BESYLATE 10 MG/1
10 TABLET ORAL DAILY
Qty: 90 TABLET | Refills: 0 | Status: SHIPPED | OUTPATIENT
Start: 2023-10-02

## 2024-01-16 DIAGNOSIS — I10 ESSENTIAL (PRIMARY) HYPERTENSION: ICD-10-CM

## 2024-01-17 RX ORDER — AMLODIPINE BESYLATE 10 MG/1
10 TABLET ORAL DAILY
Qty: 90 TABLET | Refills: 0 | OUTPATIENT
Start: 2024-01-17

## 2024-01-17 NOTE — TELEPHONE ENCOUNTER
Patient called to check the status of the refill.  Advised he would need an appt to [facility] since he has not physically been seen in office sine 2021.  Advised he could go to an urgent care to get a 30 day Rx of blood pressure medication until he could be seen in office.  Patient decline to schedule an appt at this time.